# Patient Record
Sex: FEMALE | Race: WHITE | Employment: OTHER | URBAN - NONMETROPOLITAN AREA
[De-identification: names, ages, dates, MRNs, and addresses within clinical notes are randomized per-mention and may not be internally consistent; named-entity substitution may affect disease eponyms.]

---

## 2020-06-01 ENCOUNTER — PRE-PROCEDURE TELEPHONE (OUTPATIENT)
Dept: PREADMISSION TESTING | Age: 80
End: 2020-06-01

## 2020-06-01 ENCOUNTER — HOSPITAL ENCOUNTER (OUTPATIENT)
Dept: PREADMISSION TESTING | Age: 80
Discharge: HOME | End: 2020-06-05
Payer: MEDICARE

## 2020-06-01 VITALS
DIASTOLIC BLOOD PRESSURE: 80 MMHG | OXYGEN SATURATION: 97 % | RESPIRATION RATE: 20 BRPM | HEIGHT: 67 IN | WEIGHT: 94.4 LBS | HEART RATE: 68 BPM | SYSTOLIC BLOOD PRESSURE: 156 MMHG | BODY MASS INDEX: 14.82 KG/M2 | TEMPERATURE: 99.1 F

## 2020-06-01 LAB
ANION GAP SERPL CALCULATED.3IONS-SCNC: 12 MMOL/L (ref 9–17)
BUN BLDV-MCNC: 17 MG/DL (ref 8–23)
CHLORIDE BLD-SCNC: 99 MMOL/L (ref 98–107)
CO2: 26 MMOL/L (ref 20–31)
CREAT SERPL-MCNC: 0.61 MG/DL (ref 0.5–0.9)
GFR AFRICAN AMERICAN: >60 ML/MIN
GFR NON-AFRICAN AMERICAN: >60 ML/MIN
GFR SERPL CREATININE-BSD FRML MDRD: NORMAL ML/MIN/{1.73_M2}
GFR SERPL CREATININE-BSD FRML MDRD: NORMAL ML/MIN/{1.73_M2}
GLUCOSE BLD-MCNC: 122 MG/DL (ref 70–99)
HCT VFR BLD CALC: 45.4 % (ref 36.3–47.1)
HEMOGLOBIN: 14.6 G/DL (ref 11.9–15.1)
MCH RBC QN AUTO: 33.7 PG (ref 25.2–33.5)
MCHC RBC AUTO-ENTMCNC: 32.2 G/DL (ref 28.4–34.8)
MCV RBC AUTO: 104.8 FL (ref 82.6–102.9)
NRBC AUTOMATED: 0 PER 100 WBC
PDW BLD-RTO: 12.8 % (ref 11.8–14.4)
PLATELET # BLD: 398 K/UL (ref 138–453)
PMV BLD AUTO: 8.3 FL (ref 8.1–13.5)
POTASSIUM SERPL-SCNC: 4.8 MMOL/L (ref 3.7–5.3)
RBC # BLD: 4.33 M/UL (ref 3.95–5.11)
SODIUM BLD-SCNC: 137 MMOL/L (ref 135–144)
WBC # BLD: 13.2 K/UL (ref 3.5–11.3)

## 2020-06-01 PROCEDURE — 82947 ASSAY GLUCOSE BLOOD QUANT: CPT

## 2020-06-01 PROCEDURE — 93005 ELECTROCARDIOGRAM TRACING: CPT | Performed by: ANESTHESIOLOGY

## 2020-06-01 PROCEDURE — 84520 ASSAY OF UREA NITROGEN: CPT

## 2020-06-01 PROCEDURE — 82565 ASSAY OF CREATININE: CPT

## 2020-06-01 PROCEDURE — 36415 COLL VENOUS BLD VENIPUNCTURE: CPT

## 2020-06-01 PROCEDURE — 85027 COMPLETE CBC AUTOMATED: CPT

## 2020-06-01 PROCEDURE — 80051 ELECTROLYTE PANEL: CPT

## 2020-06-01 RX ORDER — TRAZODONE HYDROCHLORIDE 150 MG/1
150 TABLET ORAL NIGHTLY
COMMUNITY

## 2020-06-01 RX ORDER — ALBUTEROL SULFATE 90 UG/1
2 AEROSOL, METERED RESPIRATORY (INHALATION) EVERY 6 HOURS PRN
COMMUNITY

## 2020-06-01 RX ORDER — SENNOSIDES 8.6 MG
650 CAPSULE ORAL EVERY 8 HOURS PRN
Status: ON HOLD | COMMUNITY
End: 2020-07-09 | Stop reason: HOSPADM

## 2020-06-01 RX ORDER — ATENOLOL 50 MG/1
50 TABLET ORAL DAILY
COMMUNITY

## 2020-06-01 RX ORDER — ONDANSETRON 4 MG/1
4 TABLET, FILM COATED ORAL EVERY 8 HOURS PRN
COMMUNITY
Start: 2020-05-01 | End: 2021-05-01

## 2020-06-01 RX ORDER — SERTRALINE HYDROCHLORIDE 100 MG/1
100 TABLET, FILM COATED ORAL DAILY
COMMUNITY

## 2020-06-01 RX ORDER — NITROGLYCERIN 0.4 MG/1
1 TABLET SUBLINGUAL EVERY 5 MIN PRN
COMMUNITY

## 2020-06-01 RX ORDER — CYCLOBENZAPRINE HCL 5 MG
5 TABLET ORAL NIGHTLY
COMMUNITY

## 2020-06-01 RX ORDER — SODIUM CHLORIDE, SODIUM LACTATE, POTASSIUM CHLORIDE, CALCIUM CHLORIDE 600; 310; 30; 20 MG/100ML; MG/100ML; MG/100ML; MG/100ML
1000 INJECTION, SOLUTION INTRAVENOUS CONTINUOUS
Status: CANCELLED | OUTPATIENT
Start: 2020-06-01

## 2020-06-01 RX ORDER — HYDROCODONE BITARTRATE AND ACETAMINOPHEN 5; 325 MG/1; MG/1
1 TABLET ORAL 2 TIMES DAILY PRN
Status: ON HOLD | COMMUNITY
End: 2020-07-09 | Stop reason: HOSPADM

## 2020-06-01 RX ORDER — BUDESONIDE AND FORMOTEROL FUMARATE DIHYDRATE 160; 4.5 UG/1; UG/1
2 AEROSOL RESPIRATORY (INHALATION) 2 TIMES DAILY
COMMUNITY

## 2020-06-01 ASSESSMENT — PAIN DESCRIPTION - PROGRESSION: CLINICAL_PROGRESSION: GRADUALLY WORSENING

## 2020-06-01 ASSESSMENT — PAIN SCALES - GENERAL: PAINLEVEL_OUTOF10: 5

## 2020-06-01 ASSESSMENT — PAIN DESCRIPTION - ONSET: ONSET: ON-GOING

## 2020-06-01 ASSESSMENT — PAIN DESCRIPTION - LOCATION: LOCATION: BACK

## 2020-06-01 ASSESSMENT — PAIN DESCRIPTION - ORIENTATION: ORIENTATION: RIGHT;LEFT

## 2020-06-01 ASSESSMENT — PAIN - FUNCTIONAL ASSESSMENT: PAIN_FUNCTIONAL_ASSESSMENT: PREVENTS OR INTERFERES SOME ACTIVE ACTIVITIES AND ADLS

## 2020-06-01 ASSESSMENT — PAIN DESCRIPTION - FREQUENCY: FREQUENCY: CONTINUOUS

## 2020-06-01 ASSESSMENT — PAIN DESCRIPTION - PAIN TYPE: TYPE: CHRONIC PAIN

## 2020-06-01 NOTE — PROGRESS NOTES
Anesthesia Focused Assessment    STOP-BANG Sleep Apnea Questionnaire    SNORE loudly (heard through closed doors)? No  TIRED, fatigued, sleepy during daytime? No  OBSERVED stopping breathing during sleep? No  High blood PRESSURE being treated? Yes    BMI over 35? No  AGE over 48? Yes  NECK circumference over 16\"? No  GENDER (male)? No             Total 2  High risk 5-8  Intermediate risk 3-4  Low risk 0-2    Obstructive Sleep Apnea: denies  If YES, machine used: no     Type 1 DM:   no  T2DM:  no    Coronary Artery Disease:  Yes, stent x 1, Dr. Alison Truong (2019). Patient stopped her brilinta and aspirin \"two days\" after stent. Hypertension:  yes    Active smoker:  Quit 2010. She was a smoker for years prior to quitting. Drinks Alcohol:  no    Dentition: upper and lower full dentures    Defib / AICD / Pacemaker: no      Renal Failure/dialysis:  no    Patient was evaluated in PAT & anesthesia guidelines were applied. NPO guidelines, medication instructions and scheduled arrival time were reviewed with patient. Hx of anesthesia complications:  no  Family hx of anesthesia complications:  no                                                                                                                     Anesthesia contacted:   Yes, Dr. Sam Province or cardiac clearance ordered: yes, cardiac. Patient had a stent placed last year, stopped brilintia on her own \"two days\" after her stent was placed. She has not been back to her cardiologist, says that she \"didn't want to take it. \"  Patient also has an AAA, last checked with US 5/2017.     David Ceballos PA-C  6/1/20  11:24 AM

## 2020-06-01 NOTE — TELEPHONE ENCOUNTER
Patient returned phone call and she stated that she would like to go to [facility] to have her test done because [place] is too far of a drive for her. We will notify [facility] department of this.

## 2020-06-02 LAB
EKG ATRIAL RATE: 66 BPM
EKG P AXIS: 77 DEGREES
EKG P-R INTERVAL: 130 MS
EKG Q-T INTERVAL: 410 MS
EKG QRS DURATION: 76 MS
EKG QTC CALCULATION (BAZETT): 429 MS
EKG R AXIS: -49 DEGREES
EKG T AXIS: -23 DEGREES
EKG VENTRICULAR RATE: 66 BPM

## 2020-06-04 ENCOUNTER — HOSPITAL ENCOUNTER (OUTPATIENT)
Dept: PREADMISSION TESTING | Age: 80
Setting detail: SPECIMEN
Discharge: HOME | End: 2020-06-08
Payer: MEDICARE

## 2020-06-04 ENCOUNTER — PRE-PROCEDURE TELEPHONE (OUTPATIENT)
Dept: PREADMISSION TESTING | Age: 80
End: 2020-06-04

## 2020-06-26 ENCOUNTER — PRE-PROCEDURE TELEPHONE (OUTPATIENT)
Dept: PREADMISSION TESTING | Age: 80
End: 2020-06-26

## 2020-07-01 ENCOUNTER — HOSPITAL ENCOUNTER (OUTPATIENT)
Dept: PREADMISSION TESTING | Age: 80
Setting detail: SPECIMEN
Discharge: HOME | End: 2020-07-05
Payer: MEDICARE

## 2020-07-01 LAB
SARS-COV-2, PCR: NORMAL
SARS-COV-2, RAPID: NORMAL
SARS-COV-2: NOT DETECTED
SOURCE: NORMAL

## 2020-07-01 PROCEDURE — U0003 INFECTIOUS AGENT DETECTION BY NUCLEIC ACID (DNA OR RNA); SEVERE ACUTE RESPIRATORY SYNDROME CORONAVIRUS 2 (SARS-COV-2) (CORONAVIRUS DISEASE [COVID-19]), AMPLIFIED PROBE TECHNIQUE, MAKING USE OF HIGH THROUGHPUT TECHNOLOGIES AS DESCRIBED BY CMS-2020-01-R: HCPCS

## 2020-07-01 RX ORDER — ASPIRIN 81 MG/1
81 TABLET ORAL DAILY
COMMUNITY

## 2020-07-02 ENCOUNTER — TELEPHONE (OUTPATIENT)
Dept: PRIMARY CARE CLINIC | Age: 80
End: 2020-07-02

## 2020-07-06 ENCOUNTER — ANESTHESIA EVENT (OUTPATIENT)
Dept: OPERATING ROOM | Age: 80
DRG: 457 | End: 2020-07-06
Payer: MEDICARE

## 2020-07-06 ENCOUNTER — HOSPITAL ENCOUNTER (INPATIENT)
Age: 80
LOS: 3 days | Discharge: NURSING, REHAB OR LONG-TERM CARE | DRG: 457 | End: 2020-07-09
Attending: ORTHOPAEDIC SURGERY | Admitting: ORTHOPAEDIC SURGERY
Payer: MEDICARE

## 2020-07-06 ENCOUNTER — APPOINTMENT (OUTPATIENT)
Dept: GENERAL RADIOLOGY | Age: 80
DRG: 457 | End: 2020-07-06
Attending: ORTHOPAEDIC SURGERY
Payer: MEDICARE

## 2020-07-06 ENCOUNTER — ANESTHESIA (OUTPATIENT)
Dept: OPERATING ROOM | Age: 80
DRG: 457 | End: 2020-07-06
Payer: MEDICARE

## 2020-07-06 VITALS — SYSTOLIC BLOOD PRESSURE: 124 MMHG | OXYGEN SATURATION: 100 % | DIASTOLIC BLOOD PRESSURE: 64 MMHG | TEMPERATURE: 98.5 F

## 2020-07-06 PROBLEM — I10 HYPERTENSION: Status: ACTIVE | Noted: 2020-07-06

## 2020-07-06 PROBLEM — Z98.1 S/P LUMBAR FUSION: Status: ACTIVE | Noted: 2020-07-06

## 2020-07-06 PROBLEM — I25.10 CAD (CORONARY ARTERY DISEASE): Status: ACTIVE | Noted: 2020-07-06

## 2020-07-06 PROBLEM — M81.0 OSTEOPOROSIS: Status: ACTIVE | Noted: 2020-07-06

## 2020-07-06 PROBLEM — I73.9 PVD (PERIPHERAL VASCULAR DISEASE) (HCC): Status: ACTIVE | Noted: 2020-07-06

## 2020-07-06 PROBLEM — J44.9 COPD (CHRONIC OBSTRUCTIVE PULMONARY DISEASE) (HCC): Status: ACTIVE | Noted: 2020-07-06

## 2020-07-06 LAB
GFR NON-AFRICAN AMERICAN: >60 ML/MIN
GFR SERPL CREATININE-BSD FRML MDRD: >60 ML/MIN
GFR SERPL CREATININE-BSD FRML MDRD: NORMAL ML/MIN/{1.73_M2}
GLUCOSE BLD-MCNC: 98 MG/DL (ref 74–100)
HCT VFR BLD CALC: 30.4 % (ref 36.3–47.1)
HEMOGLOBIN: 9.8 G/DL (ref 11.9–15.1)
POC CREATININE: 0.56 MG/DL (ref 0.51–1.19)
SARS-COV-2, PCR: NORMAL
SARS-COV-2, RAPID: NOT DETECTED
SARS-COV-2: NORMAL
SOURCE: NORMAL

## 2020-07-06 PROCEDURE — 82947 ASSAY GLUCOSE BLOOD QUANT: CPT

## 2020-07-06 PROCEDURE — 2709999900 HC NON-CHARGEABLE SUPPLY: Performed by: ORTHOPAEDIC SURGERY

## 2020-07-06 PROCEDURE — 6360000002 HC RX W HCPCS: Performed by: ANESTHESIOLOGY

## 2020-07-06 PROCEDURE — 72100 X-RAY EXAM L-S SPINE 2/3 VWS: CPT

## 2020-07-06 PROCEDURE — 6370000000 HC RX 637 (ALT 250 FOR IP): Performed by: ORTHOPAEDIC SURGERY

## 2020-07-06 PROCEDURE — 7100000001 HC PACU RECOVERY - ADDTL 15 MIN: Performed by: ORTHOPAEDIC SURGERY

## 2020-07-06 PROCEDURE — C1713 ANCHOR/SCREW BN/BN,TIS/BN: HCPCS | Performed by: ORTHOPAEDIC SURGERY

## 2020-07-06 PROCEDURE — 2580000003 HC RX 258: Performed by: NURSE ANESTHETIST, CERTIFIED REGISTERED

## 2020-07-06 PROCEDURE — 1200000000 HC SEMI PRIVATE

## 2020-07-06 PROCEDURE — 2580000003 HC RX 258: Performed by: ORTHOPAEDIC SURGERY

## 2020-07-06 PROCEDURE — 85018 HEMOGLOBIN: CPT

## 2020-07-06 PROCEDURE — 0SG10AJ FUSION OF 2 OR MORE LUMBAR VERTEBRAL JOINTS WITH INTERBODY FUSION DEVICE, POSTERIOR APPROACH, ANTERIOR COLUMN, OPEN APPROACH: ICD-10-PCS | Performed by: ORTHOPAEDIC SURGERY

## 2020-07-06 PROCEDURE — 85014 HEMATOCRIT: CPT

## 2020-07-06 PROCEDURE — 0SB20ZZ EXCISION OF LUMBAR VERTEBRAL DISC, OPEN APPROACH: ICD-10-PCS | Performed by: ORTHOPAEDIC SURGERY

## 2020-07-06 PROCEDURE — 6360000002 HC RX W HCPCS

## 2020-07-06 PROCEDURE — U0002 COVID-19 LAB TEST NON-CDC: HCPCS

## 2020-07-06 PROCEDURE — 82565 ASSAY OF CREATININE: CPT

## 2020-07-06 PROCEDURE — 6360000002 HC RX W HCPCS: Performed by: ORTHOPAEDIC SURGERY

## 2020-07-06 PROCEDURE — 72020 X-RAY EXAM OF SPINE 1 VIEW: CPT

## 2020-07-06 PROCEDURE — 3700000000 HC ANESTHESIA ATTENDED CARE: Performed by: ORTHOPAEDIC SURGERY

## 2020-07-06 PROCEDURE — 6370000000 HC RX 637 (ALT 250 FOR IP): Performed by: STUDENT IN AN ORGANIZED HEALTH CARE EDUCATION/TRAINING PROGRAM

## 2020-07-06 PROCEDURE — 2500000003 HC RX 250 WO HCPCS: Performed by: NURSE ANESTHETIST, CERTIFIED REGISTERED

## 2020-07-06 PROCEDURE — 3700000001 HC ADD 15 MINUTES (ANESTHESIA): Performed by: ORTHOPAEDIC SURGERY

## 2020-07-06 PROCEDURE — 01NB0ZZ RELEASE LUMBAR NERVE, OPEN APPROACH: ICD-10-PCS | Performed by: ORTHOPAEDIC SURGERY

## 2020-07-06 PROCEDURE — 7100000000 HC PACU RECOVERY - FIRST 15 MIN: Performed by: ORTHOPAEDIC SURGERY

## 2020-07-06 PROCEDURE — 99221 1ST HOSP IP/OBS SF/LOW 40: CPT | Performed by: NURSE PRACTITIONER

## 2020-07-06 PROCEDURE — 6360000002 HC RX W HCPCS: Performed by: STUDENT IN AN ORGANIZED HEALTH CARE EDUCATION/TRAINING PROGRAM

## 2020-07-06 PROCEDURE — 3600000005 HC SURGERY LEVEL 5 BASE: Performed by: ORTHOPAEDIC SURGERY

## 2020-07-06 PROCEDURE — 2580000003 HC RX 258: Performed by: STUDENT IN AN ORGANIZED HEALTH CARE EDUCATION/TRAINING PROGRAM

## 2020-07-06 PROCEDURE — 36415 COLL VENOUS BLD VENIPUNCTURE: CPT

## 2020-07-06 PROCEDURE — 6360000002 HC RX W HCPCS: Performed by: NURSE ANESTHETIST, CERTIFIED REGISTERED

## 2020-07-06 PROCEDURE — 3600000015 HC SURGERY LEVEL 5 ADDTL 15MIN: Performed by: ORTHOPAEDIC SURGERY

## 2020-07-06 PROCEDURE — 2580000003 HC RX 258: Performed by: ANESTHESIOLOGY

## 2020-07-06 PROCEDURE — 2780000010 HC IMPLANT OTHER: Performed by: ORTHOPAEDIC SURGERY

## 2020-07-06 PROCEDURE — 2720000010 HC SURG SUPPLY STERILE: Performed by: ORTHOPAEDIC SURGERY

## 2020-07-06 PROCEDURE — 99223 1ST HOSP IP/OBS HIGH 75: CPT | Performed by: HOSPITALIST

## 2020-07-06 PROCEDURE — 2500000003 HC RX 250 WO HCPCS: Performed by: ORTHOPAEDIC SURGERY

## 2020-07-06 DEVICE — TRANSITION 2 LEVEL IMPLANT, LORDOSED, 28MM
Type: IMPLANTABLE DEVICE | Site: SPINE LUMBAR | Status: FUNCTIONAL
Brand: TRANSITION

## 2020-07-06 DEVICE — TRANSITION 6.5MM HA POLYAXIAL PEDICLE SCREW, 40MM
Type: IMPLANTABLE DEVICE | Site: SPINE LUMBAR | Status: FUNCTIONAL
Brand: TRANSITION

## 2020-07-06 DEVICE — SIGNATURE TLIF SPACER, SMALL, 8MM
Type: IMPLANTABLE DEVICE | Site: SPINE LUMBAR | Status: FUNCTIONAL
Brand: SIGNATURE

## 2020-07-06 DEVICE — 6.5MM REVERE PEDICLE SCREW 45MM
Type: IMPLANTABLE DEVICE | Site: SPINE LUMBAR | Status: FUNCTIONAL
Brand: REVERE

## 2020-07-06 DEVICE — TRANSITION 2 LEVEL IMPLANT, LORDOSED, 32MM
Type: IMPLANTABLE DEVICE | Site: SPINE LUMBAR | Status: FUNCTIONAL
Brand: TRANSITION

## 2020-07-06 DEVICE — 6.5MM REVERE PEDICLE SCREW 50MM
Type: IMPLANTABLE DEVICE | Site: SPINE LUMBAR | Status: FUNCTIONAL
Brand: REVERE

## 2020-07-06 DEVICE — SIGNATURE TLIF SPACER, SMALL, 9MM
Type: IMPLANTABLE DEVICE | Site: SPINE LUMBAR | Status: FUNCTIONAL
Brand: SIGNATURE

## 2020-07-06 DEVICE — REVERE LOCKING CAP
Type: IMPLANTABLE DEVICE | Site: SPINE LUMBAR | Status: FUNCTIONAL
Brand: REVERE

## 2020-07-06 RX ORDER — SODIUM CHLORIDE 0.9 % (FLUSH) 0.9 %
10 SYRINGE (ML) INJECTION PRN
Status: DISCONTINUED | OUTPATIENT
Start: 2020-07-06 | End: 2020-07-09 | Stop reason: HOSPADM

## 2020-07-06 RX ORDER — TIZANIDINE 2 MG/1
4 TABLET ORAL EVERY 6 HOURS PRN
Status: DISCONTINUED | OUTPATIENT
Start: 2020-07-06 | End: 2020-07-09 | Stop reason: HOSPADM

## 2020-07-06 RX ORDER — PHENYLEPHRINE HYDROCHLORIDE 10 MG/ML
INJECTION INTRAVENOUS PRN
Status: DISCONTINUED | OUTPATIENT
Start: 2020-07-06 | End: 2020-07-06 | Stop reason: SDUPTHER

## 2020-07-06 RX ORDER — FENTANYL CITRATE 50 UG/ML
INJECTION, SOLUTION INTRAMUSCULAR; INTRAVENOUS PRN
Status: DISCONTINUED | OUTPATIENT
Start: 2020-07-06 | End: 2020-07-06 | Stop reason: SDUPTHER

## 2020-07-06 RX ORDER — SODIUM CHLORIDE 0.9 % (FLUSH) 0.9 %
10 SYRINGE (ML) INJECTION EVERY 12 HOURS SCHEDULED
Status: DISCONTINUED | OUTPATIENT
Start: 2020-07-06 | End: 2020-07-09 | Stop reason: HOSPADM

## 2020-07-06 RX ORDER — ROCURONIUM BROMIDE 10 MG/ML
INJECTION, SOLUTION INTRAVENOUS PRN
Status: DISCONTINUED | OUTPATIENT
Start: 2020-07-06 | End: 2020-07-06 | Stop reason: SDUPTHER

## 2020-07-06 RX ORDER — SODIUM CHLORIDE 9 MG/ML
INJECTION INTRAVENOUS PRN
Status: DISCONTINUED | OUTPATIENT
Start: 2020-07-06 | End: 2020-07-06 | Stop reason: SDUPTHER

## 2020-07-06 RX ORDER — ACETAMINOPHEN 325 MG/1
650 TABLET ORAL EVERY 6 HOURS
Status: DISCONTINUED | OUTPATIENT
Start: 2020-07-06 | End: 2020-07-09 | Stop reason: HOSPADM

## 2020-07-06 RX ORDER — SODIUM CHLORIDE 9 MG/ML
INJECTION, SOLUTION INTRAVENOUS CONTINUOUS
Status: DISCONTINUED | OUTPATIENT
Start: 2020-07-06 | End: 2020-07-07

## 2020-07-06 RX ORDER — ONDANSETRON 2 MG/ML
INJECTION INTRAMUSCULAR; INTRAVENOUS PRN
Status: DISCONTINUED | OUTPATIENT
Start: 2020-07-06 | End: 2020-07-06 | Stop reason: SDUPTHER

## 2020-07-06 RX ORDER — MORPHINE SULFATE 2 MG/ML
1 INJECTION, SOLUTION INTRAMUSCULAR; INTRAVENOUS
Status: DISCONTINUED | OUTPATIENT
Start: 2020-07-06 | End: 2020-07-09 | Stop reason: HOSPADM

## 2020-07-06 RX ORDER — DEXAMETHASONE SODIUM PHOSPHATE 10 MG/ML
INJECTION INTRAMUSCULAR; INTRAVENOUS PRN
Status: DISCONTINUED | OUTPATIENT
Start: 2020-07-06 | End: 2020-07-06 | Stop reason: SDUPTHER

## 2020-07-06 RX ORDER — ATENOLOL 50 MG/1
50 TABLET ORAL DAILY
Status: DISCONTINUED | OUTPATIENT
Start: 2020-07-07 | End: 2020-07-09 | Stop reason: HOSPADM

## 2020-07-06 RX ORDER — SODIUM CHLORIDE, SODIUM LACTATE, POTASSIUM CHLORIDE, CALCIUM CHLORIDE 600; 310; 30; 20 MG/100ML; MG/100ML; MG/100ML; MG/100ML
INJECTION, SOLUTION INTRAVENOUS CONTINUOUS
Status: DISCONTINUED | OUTPATIENT
Start: 2020-07-06 | End: 2020-07-06

## 2020-07-06 RX ORDER — ALBUTEROL SULFATE 90 UG/1
2 AEROSOL, METERED RESPIRATORY (INHALATION) EVERY 6 HOURS PRN
Status: DISCONTINUED | OUTPATIENT
Start: 2020-07-06 | End: 2020-07-09 | Stop reason: HOSPADM

## 2020-07-06 RX ORDER — 0.9 % SODIUM CHLORIDE 0.9 %
500 INTRAVENOUS SOLUTION INTRAVENOUS ONCE
Status: COMPLETED | OUTPATIENT
Start: 2020-07-06 | End: 2020-07-06

## 2020-07-06 RX ORDER — OXYCODONE HYDROCHLORIDE 5 MG/1
10 TABLET ORAL EVERY 4 HOURS PRN
Status: DISCONTINUED | OUTPATIENT
Start: 2020-07-06 | End: 2020-07-09 | Stop reason: HOSPADM

## 2020-07-06 RX ORDER — NITROGLYCERIN 0.4 MG/1
0.4 TABLET SUBLINGUAL EVERY 5 MIN PRN
Status: DISCONTINUED | OUTPATIENT
Start: 2020-07-06 | End: 2020-07-09 | Stop reason: HOSPADM

## 2020-07-06 RX ORDER — BUDESONIDE AND FORMOTEROL FUMARATE DIHYDRATE 160; 4.5 UG/1; UG/1
2 AEROSOL RESPIRATORY (INHALATION) 2 TIMES DAILY
Status: DISCONTINUED | OUTPATIENT
Start: 2020-07-06 | End: 2020-07-09 | Stop reason: HOSPADM

## 2020-07-06 RX ORDER — ONDANSETRON 4 MG/1
4 TABLET, FILM COATED ORAL EVERY 8 HOURS PRN
Status: DISCONTINUED | OUTPATIENT
Start: 2020-07-06 | End: 2020-07-09 | Stop reason: HOSPADM

## 2020-07-06 RX ORDER — MAGNESIUM HYDROXIDE 1200 MG/15ML
LIQUID ORAL CONTINUOUS PRN
Status: COMPLETED | OUTPATIENT
Start: 2020-07-06 | End: 2020-07-06

## 2020-07-06 RX ORDER — OXYCODONE HYDROCHLORIDE 5 MG/1
5 TABLET ORAL EVERY 4 HOURS PRN
Status: DISCONTINUED | OUTPATIENT
Start: 2020-07-06 | End: 2020-07-09 | Stop reason: HOSPADM

## 2020-07-06 RX ORDER — MORPHINE SULFATE 2 MG/ML
2 INJECTION, SOLUTION INTRAMUSCULAR; INTRAVENOUS
Status: DISCONTINUED | OUTPATIENT
Start: 2020-07-06 | End: 2020-07-09 | Stop reason: HOSPADM

## 2020-07-06 RX ORDER — ONDANSETRON 2 MG/ML
4 INJECTION INTRAMUSCULAR; INTRAVENOUS EVERY 6 HOURS PRN
Status: COMPLETED | OUTPATIENT
Start: 2020-07-06 | End: 2020-07-06

## 2020-07-06 RX ORDER — SODIUM CHLORIDE 9 MG/ML
INJECTION, SOLUTION INTRAVENOUS CONTINUOUS PRN
Status: DISCONTINUED | OUTPATIENT
Start: 2020-07-06 | End: 2020-07-06 | Stop reason: SDUPTHER

## 2020-07-06 RX ORDER — LIDOCAINE HYDROCHLORIDE 10 MG/ML
INJECTION, SOLUTION EPIDURAL; INFILTRATION; INTRACAUDAL; PERINEURAL PRN
Status: DISCONTINUED | OUTPATIENT
Start: 2020-07-06 | End: 2020-07-06 | Stop reason: SDUPTHER

## 2020-07-06 RX ORDER — POLYETHYLENE GLYCOL 3350 17 G/17G
17 POWDER, FOR SOLUTION ORAL DAILY PRN
Status: DISCONTINUED | OUTPATIENT
Start: 2020-07-06 | End: 2020-07-09 | Stop reason: HOSPADM

## 2020-07-06 RX ORDER — PROPOFOL 10 MG/ML
INJECTION, EMULSION INTRAVENOUS PRN
Status: DISCONTINUED | OUTPATIENT
Start: 2020-07-06 | End: 2020-07-06 | Stop reason: SDUPTHER

## 2020-07-06 RX ORDER — HEPARIN SODIUM 10000 [USP'U]/ML
INJECTION, SOLUTION INTRAVENOUS; SUBCUTANEOUS PRN
Status: DISCONTINUED | OUTPATIENT
Start: 2020-07-06 | End: 2020-07-06 | Stop reason: HOSPADM

## 2020-07-06 RX ADMIN — FENTANYL CITRATE 25 MCG: 50 INJECTION INTRAMUSCULAR; INTRAVENOUS at 13:41

## 2020-07-06 RX ADMIN — SODIUM CHLORIDE 500 ML: 9 INJECTION, SOLUTION INTRAVENOUS at 16:28

## 2020-07-06 RX ADMIN — MORPHINE SULFATE 2 MG: 2 INJECTION, SOLUTION INTRAMUSCULAR; INTRAVENOUS at 21:01

## 2020-07-06 RX ADMIN — LIDOCAINE HYDROCHLORIDE 50 MG: 10 INJECTION, SOLUTION EPIDURAL; INFILTRATION; INTRACAUDAL; PERINEURAL at 08:33

## 2020-07-06 RX ADMIN — SODIUM CHLORIDE, POTASSIUM CHLORIDE, SODIUM LACTATE AND CALCIUM CHLORIDE: 600; 310; 30; 20 INJECTION, SOLUTION INTRAVENOUS at 08:14

## 2020-07-06 RX ADMIN — ROCURONIUM BROMIDE 10 MG: 10 INJECTION INTRAVENOUS at 11:32

## 2020-07-06 RX ADMIN — SUGAMMADEX 120 MG: 100 INJECTION, SOLUTION INTRAVENOUS at 13:15

## 2020-07-06 RX ADMIN — FENTANYL CITRATE 100 MCG: 50 INJECTION INTRAMUSCULAR; INTRAVENOUS at 08:33

## 2020-07-06 RX ADMIN — TIZANIDINE 4 MG: 2 TABLET ORAL at 15:00

## 2020-07-06 RX ADMIN — ROCURONIUM BROMIDE 30 MG: 10 INJECTION INTRAVENOUS at 09:29

## 2020-07-06 RX ADMIN — HYDROMORPHONE HYDROCHLORIDE 0.5 MG: 1 INJECTION, SOLUTION INTRAMUSCULAR; INTRAVENOUS; SUBCUTANEOUS at 14:01

## 2020-07-06 RX ADMIN — DEXTROSE MONOHYDRATE 2 G: 50 INJECTION, SOLUTION INTRAVENOUS at 21:10

## 2020-07-06 RX ADMIN — OXYCODONE HYDROCHLORIDE 10 MG: 5 TABLET ORAL at 22:24

## 2020-07-06 RX ADMIN — HYDROMORPHONE HYDROCHLORIDE 0.25 MG: 1 INJECTION, SOLUTION INTRAMUSCULAR; INTRAVENOUS; SUBCUTANEOUS at 14:21

## 2020-07-06 RX ADMIN — FENTANYL CITRATE 25 MCG: 50 INJECTION INTRAMUSCULAR; INTRAVENOUS at 13:28

## 2020-07-06 RX ADMIN — CEFAZOLIN 2 G: 10 INJECTION, POWDER, FOR SOLUTION INTRAVENOUS at 12:50

## 2020-07-06 RX ADMIN — DEXAMETHASONE SODIUM PHOSPHATE 10 MG: 10 INJECTION INTRAMUSCULAR; INTRAVENOUS at 09:01

## 2020-07-06 RX ADMIN — HYDROMORPHONE HYDROCHLORIDE 0.25 MG: 1 INJECTION, SOLUTION INTRAMUSCULAR; INTRAVENOUS; SUBCUTANEOUS at 14:16

## 2020-07-06 RX ADMIN — FENTANYL CITRATE 50 MCG: 50 INJECTION INTRAMUSCULAR; INTRAVENOUS at 13:00

## 2020-07-06 RX ADMIN — ROCURONIUM BROMIDE 40 MG: 10 INJECTION INTRAVENOUS at 08:33

## 2020-07-06 RX ADMIN — HYDROMORPHONE HYDROCHLORIDE: 1 INJECTION, SOLUTION INTRAMUSCULAR; INTRAVENOUS; SUBCUTANEOUS at 14:31

## 2020-07-06 RX ADMIN — PROPOFOL 140 MG: 10 INJECTION, EMULSION INTRAVENOUS at 08:33

## 2020-07-06 RX ADMIN — PHENYLEPHRINE HYDROCHLORIDE 100 MCG: 10 INJECTION INTRAVENOUS at 11:42

## 2020-07-06 RX ADMIN — MORPHINE SULFATE 2 MG: 2 INJECTION, SOLUTION INTRAMUSCULAR; INTRAVENOUS at 18:07

## 2020-07-06 RX ADMIN — ACETAMINOPHEN 650 MG: 325 TABLET ORAL at 22:24

## 2020-07-06 RX ADMIN — FENTANYL CITRATE 25 MCG: 50 INJECTION INTRAMUSCULAR; INTRAVENOUS at 13:14

## 2020-07-06 RX ADMIN — SERTRALINE 100 MG: 50 TABLET, FILM COATED ORAL at 21:00

## 2020-07-06 RX ADMIN — HYDROMORPHONE HYDROCHLORIDE 0.5 MG: 1 INJECTION, SOLUTION INTRAMUSCULAR; INTRAVENOUS; SUBCUTANEOUS at 15:02

## 2020-07-06 RX ADMIN — FENTANYL CITRATE 50 MCG: 50 INJECTION INTRAMUSCULAR; INTRAVENOUS at 11:32

## 2020-07-06 RX ADMIN — SODIUM CHLORIDE 9 ML: 9 INJECTION INTRAMUSCULAR; INTRAVENOUS; SUBCUTANEOUS at 11:40

## 2020-07-06 RX ADMIN — OXYCODONE HYDROCHLORIDE 10 MG: 5 TABLET ORAL at 17:16

## 2020-07-06 RX ADMIN — ROCURONIUM BROMIDE 20 MG: 10 INJECTION INTRAVENOUS at 10:29

## 2020-07-06 RX ADMIN — HYDROMORPHONE HYDROCHLORIDE 0.5 MG: 1 INJECTION, SOLUTION INTRAMUSCULAR; INTRAVENOUS; SUBCUTANEOUS at 13:53

## 2020-07-06 RX ADMIN — PHENYLEPHRINE HYDROCHLORIDE 100 MCG: 10 INJECTION INTRAVENOUS at 11:40

## 2020-07-06 RX ADMIN — ONDANSETRON 4 MG: 2 INJECTION, SOLUTION INTRAMUSCULAR; INTRAVENOUS at 13:22

## 2020-07-06 RX ADMIN — FENTANYL CITRATE 25 MCG: 50 INJECTION INTRAMUSCULAR; INTRAVENOUS at 13:06

## 2020-07-06 RX ADMIN — CEFAZOLIN 2 G: 10 INJECTION, POWDER, FOR SOLUTION INTRAVENOUS at 08:50

## 2020-07-06 RX ADMIN — SODIUM CHLORIDE: 9 INJECTION, SOLUTION INTRAVENOUS at 12:40

## 2020-07-06 RX ADMIN — ONDANSETRON 4 MG: 2 INJECTION INTRAMUSCULAR; INTRAVENOUS at 14:37

## 2020-07-06 RX ADMIN — HYDROMORPHONE HYDROCHLORIDE 0.25 MG: 1 INJECTION, SOLUTION INTRAMUSCULAR; INTRAVENOUS; SUBCUTANEOUS at 14:26

## 2020-07-06 RX ADMIN — TRAZODONE HYDROCHLORIDE 150 MG: 100 TABLET ORAL at 21:00

## 2020-07-06 ASSESSMENT — PAIN SCALES - GENERAL
PAINLEVEL_OUTOF10: 10
PAINLEVEL_OUTOF10: 10
PAINLEVEL_OUTOF10: 5
PAINLEVEL_OUTOF10: 8
PAINLEVEL_OUTOF10: 10
PAINLEVEL_OUTOF10: 7
PAINLEVEL_OUTOF10: 10
PAINLEVEL_OUTOF10: 6
PAINLEVEL_OUTOF10: 7
PAINLEVEL_OUTOF10: 9
PAINLEVEL_OUTOF10: 10
PAINLEVEL_OUTOF10: 9
PAINLEVEL_OUTOF10: 7
PAINLEVEL_OUTOF10: 8

## 2020-07-06 ASSESSMENT — PULMONARY FUNCTION TESTS
PIF_VALUE: 7
PIF_VALUE: 14
PIF_VALUE: 15
PIF_VALUE: 15
PIF_VALUE: 14
PIF_VALUE: 15
PIF_VALUE: 14
PIF_VALUE: 14
PIF_VALUE: 15
PIF_VALUE: 14
PIF_VALUE: 14
PIF_VALUE: 7
PIF_VALUE: 15
PIF_VALUE: 14
PIF_VALUE: 14
PIF_VALUE: 15
PIF_VALUE: 14
PIF_VALUE: 15
PIF_VALUE: 2
PIF_VALUE: 2
PIF_VALUE: 15
PIF_VALUE: 12
PIF_VALUE: 18
PIF_VALUE: 15
PIF_VALUE: 14
PIF_VALUE: 14
PIF_VALUE: 2
PIF_VALUE: 14
PIF_VALUE: 14
PIF_VALUE: 15
PIF_VALUE: 14
PIF_VALUE: 14
PIF_VALUE: 7
PIF_VALUE: 15
PIF_VALUE: 2
PIF_VALUE: 14
PIF_VALUE: 15
PIF_VALUE: 14
PIF_VALUE: 2
PIF_VALUE: 14
PIF_VALUE: 15
PIF_VALUE: 14
PIF_VALUE: 14
PIF_VALUE: 15
PIF_VALUE: 14
PIF_VALUE: 15
PIF_VALUE: 15
PIF_VALUE: 14
PIF_VALUE: 14
PIF_VALUE: 2
PIF_VALUE: 15
PIF_VALUE: 14
PIF_VALUE: 15
PIF_VALUE: 14
PIF_VALUE: 14
PIF_VALUE: 15
PIF_VALUE: 14
PIF_VALUE: 2
PIF_VALUE: 15
PIF_VALUE: 1
PIF_VALUE: 13
PIF_VALUE: 15
PIF_VALUE: 15
PIF_VALUE: 14
PIF_VALUE: 5
PIF_VALUE: 14
PIF_VALUE: 27
PIF_VALUE: 14
PIF_VALUE: 14
PIF_VALUE: 13
PIF_VALUE: 14
PIF_VALUE: 7
PIF_VALUE: 15
PIF_VALUE: 14
PIF_VALUE: 2
PIF_VALUE: 14
PIF_VALUE: 14
PIF_VALUE: 15
PIF_VALUE: 15
PIF_VALUE: 14
PIF_VALUE: 16
PIF_VALUE: 14
PIF_VALUE: 15
PIF_VALUE: 14
PIF_VALUE: 15
PIF_VALUE: 14
PIF_VALUE: 15
PIF_VALUE: 15
PIF_VALUE: 14
PIF_VALUE: 15
PIF_VALUE: 14
PIF_VALUE: 14
PIF_VALUE: 15
PIF_VALUE: 15
PIF_VALUE: 14
PIF_VALUE: 1
PIF_VALUE: 15
PIF_VALUE: 14
PIF_VALUE: 14
PIF_VALUE: 1
PIF_VALUE: 14
PIF_VALUE: 15
PIF_VALUE: 14
PIF_VALUE: 15
PIF_VALUE: 14
PIF_VALUE: 13
PIF_VALUE: 14
PIF_VALUE: 14
PIF_VALUE: 15
PIF_VALUE: 14
PIF_VALUE: 15
PIF_VALUE: 14
PIF_VALUE: 7
PIF_VALUE: 15
PIF_VALUE: 15
PIF_VALUE: 14
PIF_VALUE: 15
PIF_VALUE: 14
PIF_VALUE: 14
PIF_VALUE: 15
PIF_VALUE: 15
PIF_VALUE: 1
PIF_VALUE: 14
PIF_VALUE: 15
PIF_VALUE: 14
PIF_VALUE: 14
PIF_VALUE: 15
PIF_VALUE: 13
PIF_VALUE: 14
PIF_VALUE: 2
PIF_VALUE: 15
PIF_VALUE: 14
PIF_VALUE: 14
PIF_VALUE: 13
PIF_VALUE: 14
PIF_VALUE: 15
PIF_VALUE: 14
PIF_VALUE: 15
PIF_VALUE: 14
PIF_VALUE: 14
PIF_VALUE: 1
PIF_VALUE: 14
PIF_VALUE: 15
PIF_VALUE: 15
PIF_VALUE: 14
PIF_VALUE: 14
PIF_VALUE: 16
PIF_VALUE: 14
PIF_VALUE: 15
PIF_VALUE: 14
PIF_VALUE: 15
PIF_VALUE: 14
PIF_VALUE: 2
PIF_VALUE: 14
PIF_VALUE: 14
PIF_VALUE: 15
PIF_VALUE: 15
PIF_VALUE: 14
PIF_VALUE: 1
PIF_VALUE: 15
PIF_VALUE: 14
PIF_VALUE: 15
PIF_VALUE: 1
PIF_VALUE: 2
PIF_VALUE: 15
PIF_VALUE: 13
PIF_VALUE: 14
PIF_VALUE: 14
PIF_VALUE: 12
PIF_VALUE: 14
PIF_VALUE: 7
PIF_VALUE: 2
PIF_VALUE: 14
PIF_VALUE: 15
PIF_VALUE: 14
PIF_VALUE: 14
PIF_VALUE: 15
PIF_VALUE: 14
PIF_VALUE: 14
PIF_VALUE: 15
PIF_VALUE: 15
PIF_VALUE: 2
PIF_VALUE: 14
PIF_VALUE: 14
PIF_VALUE: 15
PIF_VALUE: 14
PIF_VALUE: 14
PIF_VALUE: 15
PIF_VALUE: 14
PIF_VALUE: 15
PIF_VALUE: 15
PIF_VALUE: 14
PIF_VALUE: 15
PIF_VALUE: 15
PIF_VALUE: 14
PIF_VALUE: 2
PIF_VALUE: 15
PIF_VALUE: 14
PIF_VALUE: 15
PIF_VALUE: 18
PIF_VALUE: 2
PIF_VALUE: 15
PIF_VALUE: 1
PIF_VALUE: 14
PIF_VALUE: 7
PIF_VALUE: 1
PIF_VALUE: 14
PIF_VALUE: 15
PIF_VALUE: 15
PIF_VALUE: 7
PIF_VALUE: 14
PIF_VALUE: 14
PIF_VALUE: 15
PIF_VALUE: 15
PIF_VALUE: 2
PIF_VALUE: 14
PIF_VALUE: 15
PIF_VALUE: 7
PIF_VALUE: 14
PIF_VALUE: 2
PIF_VALUE: 16
PIF_VALUE: 15
PIF_VALUE: 15
PIF_VALUE: 14
PIF_VALUE: 15
PIF_VALUE: 15
PIF_VALUE: 14
PIF_VALUE: 8
PIF_VALUE: 14
PIF_VALUE: 16
PIF_VALUE: 14
PIF_VALUE: 15
PIF_VALUE: 14
PIF_VALUE: 15
PIF_VALUE: 14
PIF_VALUE: 15
PIF_VALUE: 15
PIF_VALUE: 14
PIF_VALUE: 14
PIF_VALUE: 15
PIF_VALUE: 7

## 2020-07-06 ASSESSMENT — ENCOUNTER SYMPTOMS
RESPIRATORY NEGATIVE: 1
EYES NEGATIVE: 1
ALLERGIC/IMMUNOLOGIC NEGATIVE: 1
BACK PAIN: 1
GASTROINTESTINAL NEGATIVE: 1

## 2020-07-06 ASSESSMENT — PAIN - FUNCTIONAL ASSESSMENT: PAIN_FUNCTIONAL_ASSESSMENT: 0-10

## 2020-07-06 ASSESSMENT — PAIN DESCRIPTION - PAIN TYPE
TYPE: SURGICAL PAIN
TYPE: SURGICAL PAIN

## 2020-07-06 ASSESSMENT — PAIN DESCRIPTION - ORIENTATION: ORIENTATION: RIGHT

## 2020-07-06 ASSESSMENT — PAIN DESCRIPTION - LOCATION
LOCATION: BACK
LOCATION: BACK

## 2020-07-06 ASSESSMENT — PAIN DESCRIPTION - DESCRIPTORS: DESCRIPTORS: OTHER (COMMENT)

## 2020-07-06 NOTE — H&P
Surgical History and Physical Exam    Reason for surgery:  The patient is a 68 y.o. female that presents today for L2, L3, L4 laminectomy and posterior lumbar interbody fusion L3-L4, L4-L5 w/Transition jefry L2-L5. Past Medical History:    Past Medical History:   Diagnosis Date    Abdominal aortic aneurysm (AAA) ([facility] [address]     US 12/2015, 5/2017 (2.9 cm on US 2017)    Aortic valve sclerosis     Arthritis     CAD (coronary artery disease)     stent Sept. 17, 2019 Dr. Sherry Hall [place] (stopped taking her antiplatelets)    COPD (chronic obstructive pulmonary disease) ([facility] [address]     Dr. Carolina Loving Hypertension     Dr. Carolina Loving Lumbar disc disease     Lung nodule     Osteoporosis     PVD (peripheral vascular disease) ([facility] [address]     Raynaud's disease     Wears dentures     full upper and lower    Wears prescription eyeglasses     Weight loss     Wellness examination     Dr. Lise Zamora last seen approx January 2020     Past Surgical History:    Past Surgical History:   Procedure Laterality Date    APPENDECTOMY      1962    CARDIAC CATHETERIZATION  09/2019    stents 2019 Dr. [place] Lagunas      cataract approx 2010   [address]    COLONOSCOPY      2010    DILATATION, ESOPHAGUS      small intestine removal of apricot. 3/19/2010 approx    ENDOSCOPY, COLON, DIAGNOSTIC      2010    ERCP      HYSTERECTOMY      TONSILLECTOMY      at 13 yrs old     Medications Prior to Admission:   Prior to Admission medications    Medication Sig Start Date End Date Taking? Authorizing Provider   aspirin 81 MG EC tablet Take 81 mg by mouth daily Pt. Will stop 7-2-20 for OR/ mgmt.     Yes Historical Provider, MD   budesonide-formoterol (SYMBICORT) 160-4.5 MCG/ACT AERO Inhale 2 puffs into the lungs 2 times daily    Historical Provider, MD   Tiotropium Bromide Monohydrate (SPIRIVA HANDIHALER IN) Inhale 1 puff into the lungs daily    Historical Provider, MD   albuterol sulfate HFA (VENTOLIN HFA) 108 (90 Base) MCG/ACT inhaler Inhale 2 puffs into the lungs every 6 hours as needed for Wheezing    Historical Provider, MD   atenolol (TENORMIN) 50 MG tablet Take 50 mg by mouth daily    Historical Provider, MD   sertraline (ZOLOFT) 100 MG tablet Take 100 mg by mouth daily    Historical Provider, MD   cyclobenzaprine (FLEXERIL) 5 MG tablet Take 5 mg by mouth nightly    Historical Provider, MD   traZODone (DESYREL) 150 MG tablet Take 150 mg by mouth nightly    Historical Provider, MD   HYDROcodone-acetaminophen (NORCO) 5-325 MG per tablet Take 1 tablet by mouth 2 times daily as needed for Pain. Historical Provider, MD   acetaminophen (TYLENOL) 650 MG extended release tablet Take 650 mg by mouth every 8 hours as needed    Historical Provider, MD   nitroGLYCERIN (NITROSTAT) 0.4 MG SL tablet Place 1 tablet under the tongue every 5 minutes as needed For 3 doses total    Historical Provider, MD   ondansetron (ZOFRAN) 4 MG tablet Take 4 mg by mouth every 8 hours as needed 5/1/20 5/1/21  Historical Provider, MD     Allergies:    Ciprofloxacin; Omeprazole; Doxycycline hyclate; Alprazolam; Lorazepam; and Sulfa antibiotics  Social History:   Social History     Socioeconomic History    Marital status:       Spouse name: None    Number of children: None    Years of education: None    Highest education level: None   Occupational History    None   Social Needs    Financial resource strain: None    Food insecurity     Worry: None     Inability: None    Transportation needs     Medical: None     Non-medical: None   Tobacco Use    Smoking status: Former Smoker     Packs/day: 0.50     Last attempt to quit: 1/1/2010     Years since quitting: 10.5    Smokeless tobacco: Never Used   Substance and Sexual Activity    Alcohol use: Not Currently    Drug use: Never    Sexual activity: None   Lifestyle    Physical activity     Days per week: None     Minutes per session: None    Stress: None   Relationships    Social connections     Talks on phone: None     Gets together: None     Attends Zoroastrian service: None     Active member of club or organization: None     Attends meetings of clubs or organizations: None     Relationship status: None    Intimate partner violence     Fear of current or ex partner: None     Emotionally abused: None     Physically abused: None     Forced sexual activity: None   Other Topics Concern    None   Social History Narrative    None     Family History:  Family History   Adopted: Yes       REVIEW OF SYSTEMS:  Constitutional: Negative for fever and chills. HENT: Negative for congestion or drainage   Eyes: Negative for blurred vision and double vision. Respiratory: Negative for cough, shortness of breath and wheezing. Cardiovascular: Negative for chest pain and palpitations. Gastrointestinal: Negative for nausea. Negative for vomiting. Musculoskeletal: Positive for lowe back pain   Skin: Negative for itching and rash. Neurological: Negative for dizziness, sensory change and headaches. Psychiatric/Behavioral: Negative for depression and suicidal ideas. PHYSICAL EXAM:  Temperature 98.8 °F (37.1 °C), height 5' 7\" (1.702 m), weight 88 lb (39.9 kg). Gen: alert and oriented, NAD, cooperative  Head: normocephalic atraumatic   Cardiovascular: Regular rate, no dependent edema, distal pulses 2+  Respiratory: Chest symmetric, no accessory muscle use, normal respirations    Spine: Skin intact. No sings of infection. TTP to lumbar paraspinal musculature. BLE: Skin intact. No TTP. Compartments soft. Foot warm and perfused. TA/EHL/FHL/GS motor intact. Deep and Superficial Peroneal/Saphenous/Sural SILT. A/P: 68 y.o. female  was evaluated and after discussion surgical and non surgical options, the patient has decided to undergo L2, L3, L4 laminectomy and posterior lumbar interbody fusion L3-L4, L4-L5 w/Transition jefry L2-L5. Consent obtained and in chart.  All questions answered appropriately. Surgical risks including but not limited to: bleeding, blood clots, infection, damage to surrounding tissues/nerves/vessels, failure of fixation, failure of wounds to heal, loss of motion, stiffness, dislocation, postoperative pain, recurrence of symptoms, need for future surgery,  risks of anesthesia, loss of limb and loss of life were all discussed with the patient. Knowing these risks, the patient wishes to proceed with surgery. -Abx OCTOR  -Site marked, Consent in chart  -AC held  -NPO since midnight  -All question answered.     Laila Bray DO   Orthopedic Surgery Resident PGY-3  [facility]

## 2020-07-06 NOTE — ANESTHESIA PRE PROCEDURE
Department of Anesthesiology  Preprocedure Note       Name:  Naima Nazario   Age:  68 y.o.  :  1940                                          MRN:  [id number]         Date:  2020      Surgeon: Macie Nazario):  Vincenzo Godinez MD    Procedure: Procedure(s):  LAMINECTOMY L2,3,4, POSTERIOR LUMBAR INTERBODY FUSION L4-5 WITH TRANSITION ARLETTE L2-L5, C-ARM. MARIBEL TABLE, GLOBUS,  AUTO    Medications prior to admission:   Prior to Admission medications    Medication Sig Start Date End Date Taking? Authorizing Provider   aspirin 81 MG EC tablet Take 81 mg by mouth daily Pt. Will stop 20 for OR/ mgmt.    Yes Historical Provider, MD   budesonide-formoterol (SYMBICORT) 160-4.5 MCG/ACT AERO Inhale 2 puffs into the lungs 2 times daily    Historical Provider, MD   Tiotropium Bromide Monohydrate (SPIRIVA HANDIHALER IN) Inhale 1 puff into the lungs daily    Historical Provider, MD   albuterol sulfate HFA (VENTOLIN HFA) 108 (90 Base) MCG/ACT inhaler Inhale 2 puffs into the lungs every 6 hours as needed for Wheezing    Historical Provider, MD   atenolol (TENORMIN) 50 MG tablet Take 50 mg by mouth daily    Historical Provider, MD   sertraline (ZOLOFT) 100 MG tablet Take 100 mg by mouth daily    Historical Provider, MD   cyclobenzaprine (FLEXERIL) 5 MG tablet Take 5 mg by mouth nightly    Historical Provider, MD   traZODone (DESYREL) 150 MG tablet Take 150 mg by mouth nightly    Historical Provider, MD   HYDROcodone-acetaminophen (NORCO) 5-325 MG per tablet Take 1 tablet by mouth 2 times daily as needed for Pain.     Historical Provider, MD   acetaminophen (TYLENOL) 650 MG extended release tablet Take 650 mg by mouth every 8 hours as needed    Historical Provider, MD   nitroGLYCERIN (NITROSTAT) 0.4 MG SL tablet Place 1 tablet under the tongue every 5 minutes as needed For 3 doses total    Historical Provider, MD   ondansetron (ZOFRAN) 4 MG tablet Take 4 mg by mouth every 8 hours as needed 20 Historical Provider, MD       Current medications:    No current facility-administered medications for this encounter. Allergies: Allergies   Allergen Reactions    Ciprofloxacin Other (See Comments)     Achilles tendon rupture    Omeprazole Swelling     Face, eyes, throat    Doxycycline Hyclate Diarrhea     Dry heaves also    Alprazolam Other (See Comments)     AGGRESIVE    Lorazepam Other (See Comments)     AGGRESSIVE    Sulfa Antibiotics Other (See Comments)     unknown       Problem List:  There is no problem list on file for this patient. Past Medical History:        Diagnosis Date    Abdominal aortic aneurysm (AAA) [address] [address]     US 12/2015, 5/2017 (2.9 cm on US 2017)    Aortic valve sclerosis     Arthritis     CAD (coronary artery disease)     stent Sept. 17, 2019 Dr. Gary Lerner [place] (stopped taking her antiplatelets)    COPD (chronic obstructive pulmonary disease) ([facility] [address]     Dr. Mcdaniel Hypertension     Dr. Mcdaniel Lumbar disc disease     Lung nodule     Osteoporosis     PVD (peripheral vascular disease) ([facility] [address]     Raynaud's disease     Wears dentures     full upper and lower    Wears prescription eyeglasses     Weight loss     Wellness examination     Dr. Dorothy Noonan last seen approx January 2020       Past Surgical History:        Procedure Laterality Date    APPENDECTOMY      1962    CARDIAC CATHETERIZATION  09/2019 stents 2019 Dr. Eryn Pelaez      cataract approx 2010   [address]    COLONOSCOPY      2010    DILATATION, ESOPHAGUS      small intestine removal of apricot.  3/19/2010 approx    ENDOSCOPY, COLON, DIAGNOSTIC      2010    ERCP      HYSTERECTOMY      TONSILLECTOMY      at 13 yrs old       Social History:    Social History     Tobacco Use    Smoking status: Former Smoker     Packs/day: 0.50     Last attempt to quit: 1/1/2010     Years since quitting: 10.5    Smokeless tobacco: Never Used   Substance Use Topics    Alcohol use: Not Currently                                Counseling given: Not Answered      Vital Signs (Current): There were no vitals filed for this visit. BP Readings from Last 3 Encounters:   06/01/20 (!) 156/80       NPO Status:                                                                                 BMI:   Wt Readings from Last 3 Encounters:   06/01/20 94 lb 6.4 oz (42.8 kg)     There is no height or weight on file to calculate BMI.    CBC:   Lab Results   Component Value Date    WBC 13.2 06/01/2020    RBC 4.33 06/01/2020    HGB 14.6 06/01/2020    HCT 45.4 06/01/2020    .8 06/01/2020    RDW 12.8 06/01/2020     06/01/2020       CMP:   Lab Results   Component Value Date     06/01/2020    K 4.8 06/01/2020    CL 99 06/01/2020    CO2 26 06/01/2020    BUN 17 06/01/2020    CREATININE 0.61 06/01/2020    GFRAA >60 06/01/2020    LABGLOM >60 06/01/2020    GLUCOSE 122 06/01/2020       POC Tests: No results for input(s): POCGLU, POCNA, POCK, POCCL, POCBUN, POCHEMO, POCHCT in the last 72 hours.     Coags: No results found for: PROTIME, INR, APTT    HCG (If Applicable): No results found for: PREGTESTUR, PREGSERUM, HCG, HCGQUANT     ABGs: No results found for: PHART, PO2ART, FPP8ZQD, AUK4BRQ, BEART, [id number]     Type & Screen (If Applicable):  No results found for: LABABO, LABRH    Drug/Infectious Status (If Applicable):  No results found for: HIV, HEPCAB    COVID-19 Screening (If Applicable):   Lab Results   Component Value Date    COVID19 Not Detected 07/01/2020         Anesthesia Evaluation  Patient summary reviewed and Nursing notes reviewed no history of anesthetic complications:   Airway: Mallampati: II  TM distance: >3 FB   Neck ROM: full  Mouth opening: > = 3 FB Dental:    (+) upper dentures and lower dentures      Pulmonary:normal exam    (+) COPD:                             Cardiovascular:  Exercise tolerance: good (>4 METS),   (+) hypertension:, CAD: non-obstructive, CABG/stent: no interval change,     (-)  angina,  CHF, orthopnea, PND and  MOORE    ECG reviewed  Rhythm: regular  Rate: normal  Echocardiogram reviewed  Stress test reviewed       Beta Blocker:  Not on Beta Blocker         Neuro/Psych:   Negative Neuro/Psych ROS              GI/Hepatic/Renal: Neg GI/Hepatic/Renal ROS            Endo/Other: Negative Endo/Other ROS                    Abdominal:           Vascular:                                      Anesthesia Plan      general     ASA 3       Induction: intravenous. MIPS: Postoperative opioids intended and Prophylactic antiemetics administered. Anesthetic plan and risks discussed with patient.       Plan discussed with CRNA and surgical team.                  Ashly Leigh MD   7/6/2020

## 2020-07-06 NOTE — BRIEF OP NOTE
Brief Postoperative Note      Patient: Ezequiel Buitrago  YOB: 1940  MRN: [id number]    Date of Procedure: 7/6/2020    Pre-Op Diagnosis: SCOLIOSIS, INSTABILITY,  LUMBAR STENOSIS    Post-Op Diagnosis: Same       Procedure(s):  1. LAMINECTOMY L3, L4,   2. POSTERIOR LUMBAR INTERBODY FUSION L4-5 WITH TRANSITION ARLETTE L2-L5    Surgeon(s):  Ashlee Turner MD    Assistant:  Resident: Dian Osei DO    Anesthesia: General    Estimated Blood Loss (mL): 275cc    IV Fluids: 1400cc crystalloid    Urine output: 532WA     Complications: None    Specimens: None    Implants:  Implant Name Type Inv. Item Serial No.  Lot No. LRB No. Used Action   SCREW PEDCL TRANS HA 6.5X40MM ST Spine SCREW PEDCL TRANS HA 6.5X40MM ST  [employer] [id number] N/A 1 Implanted   SCREW PEDCL TRANS HA 6.5X40MM ST Spine SCREW PEDCL TRANS HA 6.5X40MM ST  [employer] [id number] N/A 1 Implanted   IMPL SPINE SCREW TRANSITION 28MM 2LVL Spine IMPL SPINE SCREW TRANSITION 28MM 2LVL  [employer] [id number] N/A 1 Implanted   IMPL SPINE ARLETTE TRANSITION ST 32MM 2LVL Spine IMPL SPINE ARLETTE TRANSITION ST 32MM 2LVL  [employer] [id number] N/A 1 Implanted   IMPL SPINE CAGE SIGNATURE 5X8MM Spine IMPL SPINE CAGE SIGNATURE 5X8MM  [employer]  N/A 1 Implanted   SCREW SPINE PEDCL REVERE 6.5X45MM Spine SCREW SPINE PEDCL REVERE 6.5X45MM  [employer]  N/A 3 Implanted   SCREW SPINE PEDCL REVERE 6.5X50MM Spine SCREW SPINE PEDCL REVERE 6.5X50MM  [employer]  N/A 3 Implanted   IMPL SPINE CAGE SIGNATURE 5X9MM Spine IMPL SPINE CAGE SIGNATURE 5X9MM  [employer]  N/A 1 Implanted   CAP LK SPINAL STBL Spine CAP LK SPINAL STBL  [employer]  N/A 8 Implanted         Drains:   Closed/Suction Drain Inferior;Midline Back Other (Comment) (Active)       [REMOVED] Urethral Catheter Non-latex;Straight-tip;Double-lumen (Removed)       Findings: Lumbar stenosis with scoliosis. See op note for details.      Electronically signed by Dian Osei DO on 7/6/2020 at 1:40 PM

## 2020-07-06 NOTE — CONSULTS
[address] / HISTORY AND PHYSICAL EXAMINATION            Date:   7/6/2020  Patient name: Ken De Los Santos  Date of admission:  7/6/2020  6:04 AM  MRN:   [id number]  Account:  [de-identified]  YOB: 1940  PCP:    Tayla Flores MD  Room:   6056/6971-06  Code Status:    Full Code    Physician Requesting Consult: Tata Fry MD    Reason for Consult:  Post-op medical management    Chief Complaint:     No chief complaint on file. History Obtained From:     patient, electronic medical record    History of Present Illness: The patient is a 68 y.o. female that presents today for L2, L3, L4 laminectomy and posterior lumbar interbody fusion L3-L4, L4-L5 w/Transition jefry L2-L5. Past Medical History:     Past Medical History:   Diagnosis Date    Abdominal aortic aneurysm (AAA) ([facility]     US 12/2015, 5/2017 (2.9 cm on US 2017)    Aortic valve sclerosis     Arthritis     CAD (coronary artery disease)     stent Sept. 17, 2019 Dr. Renita Ontiveros [place] (stopped taking her antiplatelets)    COPD (chronic obstructive pulmonary disease) ([facility] [address]     Dr. Kimball Service Hypertension     Dr. Kimball Service Lumbar disc disease     Lung nodule     Osteoporosis     PVD (peripheral vascular disease) ([facility] [address]     Raynaud's disease     Wears dentures     full upper and lower    Wears prescription eyeglasses     Weight loss     Wellness examination     Dr. Guille Almeida last seen approx January 2020        Past Surgical History:     Past Surgical History:   Procedure Laterality Date    APPENDECTOMY      1962    BACK SURGERY  07/06/2020    L3-L4 denise fusion    CARDIAC CATHETERIZATION  09/2019    stents 2019 Dr. Oralia Graves      cataract approx 2010   [address]    COLONOSCOPY      2010    DILATATION, ESOPHAGUS      small intestine removal of apricot.  3/19/2010 approx    ENDOSCOPY, COLON, DIAGNOSTIC      2010    ERCP      HYSTERECTOMY      TONSILLECTOMY      at 13 yrs old        Medications Prior to Admission:     Prior to Admission medications    Medication Sig Start Date End Date Taking? Authorizing Provider   aspirin 81 MG EC tablet Take 81 mg by mouth daily Pt. Will stop 7-2-20 for OR/ mgmt.    Yes Historical Provider, MD   budesonide-formoterol (SYMBICORT) 160-4.5 MCG/ACT AERO Inhale 2 puffs into the lungs 2 times daily   Yes Historical Provider, MD   Tiotropium Bromide Monohydrate (SPIRIVA HANDIHALER IN) Inhale 1 puff into the lungs daily   Yes Historical Provider, MD   albuterol sulfate HFA (VENTOLIN HFA) 108 (90 Base) MCG/ACT inhaler Inhale 2 puffs into the lungs every 6 hours as needed for Wheezing   Yes Historical Provider, MD   atenolol (TENORMIN) 50 MG tablet Take 50 mg by mouth daily   Yes Historical Provider, MD   sertraline (ZOLOFT) 100 MG tablet Take 100 mg by mouth daily   Yes Historical Provider, MD   cyclobenzaprine (FLEXERIL) 5 MG tablet Take 5 mg by mouth nightly   Yes Historical Provider, MD   traZODone (DESYREL) 150 MG tablet Take 150 mg by mouth nightly   Yes Historical Provider, MD   HYDROcodone-acetaminophen (NORCO) 5-325 MG per tablet Take 1 tablet by mouth 2 times daily as needed for Pain. Yes Historical Provider, MD   acetaminophen (TYLENOL) 650 MG extended release tablet Take 650 mg by mouth every 8 hours as needed   Yes Historical Provider, MD   nitroGLYCERIN (NITROSTAT) 0.4 MG SL tablet Place 1 tablet under the tongue every 5 minutes as needed For 3 doses total    Historical Provider, MD   ondansetron (ZOFRAN) 4 MG tablet Take 4 mg by mouth every 8 hours as needed 5/1/20 5/1/21  Historical Provider, MD        Allergies:     Ciprofloxacin; Omeprazole; Doxycycline hyclate; Alprazolam; Lorazepam; and Sulfa antibiotics    Social History:     Tobacco:    reports that she quit smoking about 10 years ago. She smoked 0.50 packs per day.  She has never used smokeless tobacco.  Alcohol:      reports previous alcohol use. Drug Use:  reports no history of drug use. Family History:     Family History   Adopted: Yes       Review of Systems:     Positive and Negative as described in HPI. Review of Systems   Constitutional: Positive for activity change. Negative for chills and fever. HENT: Negative. Eyes: Negative. Respiratory: Negative. Cardiovascular: Negative. Gastrointestinal: Negative. Endocrine: Negative. Genitourinary: Negative. Musculoskeletal: Positive for arthralgias and back pain. Skin: Negative. Allergic/Immunologic: Negative. Neurological: Negative. Hematological: Bruises/bleeds easily. Psychiatric/Behavioral: Negative. Physical Exam:     BP (!) 85/48   Pulse 85   Temp 97.7 °F (36.5 °C) (Oral)   Resp 9   Ht 5' 7\" (1.702 m)   Wt 88 lb (39.9 kg)   SpO2 94%   BMI 13.78 kg/m²   Temp (24hrs), Av.6 °F (37 °C), Min:96.8 °F (36 °C), Max:99.6 °F (37.6 °C)    Recent Labs     20  0811   POCGLU 98       Intake/Output Summary (Last 24 hours) at 2020 1702  Last data filed at 2020 1653  Gross per 24 hour   Intake 1645 ml   Output 655 ml   Net 990 ml       Physical Exam  Vitals signs and nursing note reviewed. Constitutional:       Comments: Irritable. HENT:      Head: Normocephalic and atraumatic. Mouth/Throat:      Mouth: Mucous membranes are moist.   Eyes:      Extraocular Movements: Extraocular movements intact. Pupils: Pupils are equal, round, and reactive to light. Neck:      Musculoskeletal: Normal range of motion and neck supple. Trachea: Trachea normal.   Cardiovascular:      Rate and Rhythm: Normal rate and regular rhythm. Pulses: Normal pulses. Heart sounds: Normal heart sounds, S1 normal and S2 normal.   Pulmonary:      Effort: Pulmonary effort is normal.      Breath sounds: Normal breath sounds. Abdominal:      General: Abdomen is flat.  Bowel sounds are normal. There is no distension. Palpations: There is no mass. Tenderness: There is no abdominal tenderness. Genitourinary:     Comments: Rodriguez d/c at 1340. No urge to void yet. Musculoskeletal:      Thoracic back: She exhibits pain. Lumbar back: She exhibits pain. Right lower leg: No edema. Left lower leg: No edema. Skin:     General: Skin is cool and dry. Capillary Refill: Capillary refill takes less than 2 seconds. Findings: Ecchymosis present. Comments: Bilateral forearm ecchymosis. Neurological:      Mental Status: She is alert and oriented to person, place, and time. Psychiatric:         Speech: Speech normal.         Behavior: Behavior normal.      Comments: Irritable. + pain         Investigations:      Laboratory Testing:  Recent Results (from the past 24 hour(s))   COVID-19    Collection Time: 07/06/20  6:56 AM   Result Value Ref Range    SARS-CoV-2          SARS-CoV-2, Rapid Not Detected Not Detected    Source . NASOPHARYNGEAL SWAB     SARS-CoV-2, PCR         Creatinine W/GFR Point of Care    Collection Time: 07/06/20  8:11 AM   Result Value Ref Range    POC Creatinine 0.56 0.51 - 1.19 mg/dL    GFR Comment >60 >60 mL/min    GFR Non-African American >60 >60 mL/min    GFR Comment         POCT Glucose    Collection Time: 07/06/20  8:11 AM   Result Value Ref Range    POC Glucose 98 74 - 100 mg/dL       Imaging/Diagonstics:  Xr Lumbar Spine 1 Vw    Result Date: 7/6/2020  Lateral localization image intraoperative with probe superficial to the L5-S1 disc space. Assessment :      Hospital Problems           Last Modified POA    * (Principal) S/P lumbar fusion 7/6/2020 Yes    CAD (coronary artery disease) 7/6/2020 Yes    COPD (chronic obstructive pulmonary disease) ([address] 7/6/2020 Yes    Osteoporosis 7/6/2020 Yes    PVD (peripheral vascular disease) ([address] 7/6/2020 Yes    Hypertension 7/6/2020 Yes          Plan:     1.  Home medications per primary  2. Fluid bolus 500 ml for post-op hypotension  3. Pain control  4. Monitor labs. Will trend and replete electrolytes as needed. Adan H/H.  5. DVT prophylaxis  6. GI prophylaxis  7. Restart home diet  8.  PT/OT    Consultations:   IP CONSULT TO HOSPITALIST      KIA Mckeon NP  7/6/2020  5:02 PM    Copy sent to Dr. Osman Nguyen MD

## 2020-07-06 NOTE — CARE COORDINATION
Case Management Initial Discharge Plan  Tico Maier,             Met with:patient or spouse/SO to discuss discharge plans. Information verified: address, contacts, phone number, , insurance Yes    Emergency Contact/Next of Kin name & number: Catina Singh s/o    PCP: Joy Alejandro MD  Date of last visit: 1 mth    Insurance Provider: medicaree    Discharge Planning    Living Arrangements:  Spouse/Significant Other   Support Systems:  Spouse/Significant Other    Home has 1 stories  5 stairs to climb to get into front door, stairs to climb to reach second floor  Location of bedroom/bathroom in home     Patient able to perform ADL's:Independent however prior to surgery had been in a lot of pain and not moving real well    Current Services (outpatient & in home) none  DME equipment: none  DME provider:     Receiving oral anticoagulation therapy? No    If indicated:   Physician managing anticoagulation treatment: n/a  Where does patient obtain lab work for ATC treatment? Potential Assistance Needed:  N/A    Patient agreeable to home care: Yes if needed  Freedom of choice provided:  yes if needed    Prior SNF/Rehab Placement and Facility: none  Agreeable to SNF/Rehab: No  [place] of choice provided: n/a     Evaluation: no    Expected Discharge date:  07/10/20    Patient expects to be discharged to:  home vs snf  Follow Up Appointment: Best Day/ Time: Tuesday AM    Transportation provider: Marquez Briseno  Transportation arrangements needed for discharge: No    Readmission Risk              Risk of Unplanned Readmission:        9             Does patient have a readmission risk score greater than 14?: No  If yes, follow-up appointment must be made within 7 days of discharge.      Goals of Care: resume adls      Discharge Plan: goal is home w/s/o agreeable to home care if needed otherwise outpt therapy monitor therapy evals and progress          Electronically signed by Mimi Seals RN on 20 at 5:41 PM EDT

## 2020-07-07 LAB
ABSOLUTE EOS #: <0.03 K/UL (ref 0–0.44)
ABSOLUTE IMMATURE GRANULOCYTE: 0.24 K/UL (ref 0–0.3)
ABSOLUTE LYMPH #: 1.34 K/UL (ref 1.1–3.7)
ABSOLUTE MONO #: 1.48 K/UL (ref 0.1–1.2)
ALBUMIN SERPL-MCNC: 2.5 G/DL (ref 3.5–5.2)
ALBUMIN/GLOBULIN RATIO: 1.1 (ref 1–2.5)
ALP BLD-CCNC: 111 U/L (ref 35–104)
ALT SERPL-CCNC: 9 U/L (ref 5–33)
ANION GAP SERPL CALCULATED.3IONS-SCNC: 11 MMOL/L (ref 9–17)
AST SERPL-CCNC: 25 U/L
BASOPHILS # BLD: 0 % (ref 0–2)
BASOPHILS ABSOLUTE: 0.04 K/UL (ref 0–0.2)
BILIRUB SERPL-MCNC: <0.1 MG/DL (ref 0.3–1.2)
BILIRUBIN DIRECT: <0.08 MG/DL
BILIRUBIN, INDIRECT: ABNORMAL MG/DL (ref 0–1)
BUN BLDV-MCNC: 20 MG/DL (ref 8–23)
BUN/CREAT BLD: ABNORMAL (ref 9–20)
CALCIUM SERPL-MCNC: 8.1 MG/DL (ref 8.6–10.4)
CHLORIDE BLD-SCNC: 104 MMOL/L (ref 98–107)
CO2: 24 MMOL/L (ref 20–31)
CREAT SERPL-MCNC: 0.52 MG/DL (ref 0.5–0.9)
DIFFERENTIAL TYPE: ABNORMAL
EOSINOPHILS RELATIVE PERCENT: 0 % (ref 1–4)
GFR AFRICAN AMERICAN: >60 ML/MIN
GFR NON-AFRICAN AMERICAN: >60 ML/MIN
GFR SERPL CREATININE-BSD FRML MDRD: ABNORMAL ML/MIN/{1.73_M2}
GFR SERPL CREATININE-BSD FRML MDRD: ABNORMAL ML/MIN/{1.73_M2}
GLOBULIN: ABNORMAL G/DL (ref 1.5–3.8)
GLUCOSE BLD-MCNC: 102 MG/DL (ref 70–99)
HCT VFR BLD CALC: 28.8 % (ref 36.3–47.1)
HEMOGLOBIN: 8.8 G/DL (ref 11.9–15.1)
IMMATURE GRANULOCYTES: 1 %
LYMPHOCYTES # BLD: 8 % (ref 24–43)
MCH RBC QN AUTO: 31.8 PG (ref 25.2–33.5)
MCHC RBC AUTO-ENTMCNC: 30.6 G/DL (ref 28.4–34.8)
MCV RBC AUTO: 104 FL (ref 82.6–102.9)
MONOCYTES # BLD: 8 % (ref 3–12)
NRBC AUTOMATED: 0 PER 100 WBC
PDW BLD-RTO: 12.7 % (ref 11.8–14.4)
PLATELET # BLD: ABNORMAL K/UL (ref 138–453)
PLATELET ESTIMATE: ABNORMAL
PLATELET, FLUORESCENCE: NORMAL K/UL (ref 138–453)
PLATELET, IMMATURE FRACTION: NORMAL % (ref 1.1–10.3)
PMV BLD AUTO: ABNORMAL FL (ref 8.1–13.5)
POTASSIUM SERPL-SCNC: 5.2 MMOL/L (ref 3.7–5.3)
RBC # BLD: 2.77 M/UL (ref 3.95–5.11)
RBC # BLD: ABNORMAL 10*6/UL
SEG NEUTROPHILS: 83 % (ref 36–65)
SEGMENTED NEUTROPHILS ABSOLUTE COUNT: 14.86 K/UL (ref 1.5–8.1)
SODIUM BLD-SCNC: 139 MMOL/L (ref 135–144)
TOTAL PROTEIN: 4.8 G/DL (ref 6.4–8.3)
WBC # BLD: 18 K/UL (ref 3.5–11.3)
WBC # BLD: ABNORMAL 10*3/UL

## 2020-07-07 PROCEDURE — 80048 BASIC METABOLIC PNL TOTAL CA: CPT

## 2020-07-07 PROCEDURE — 6370000000 HC RX 637 (ALT 250 FOR IP): Performed by: INTERNAL MEDICINE

## 2020-07-07 PROCEDURE — 97166 OT EVAL MOD COMPLEX 45 MIN: CPT

## 2020-07-07 PROCEDURE — 85025 COMPLETE CBC W/AUTO DIFF WBC: CPT

## 2020-07-07 PROCEDURE — 94640 AIRWAY INHALATION TREATMENT: CPT

## 2020-07-07 PROCEDURE — 2580000003 HC RX 258: Performed by: INTERNAL MEDICINE

## 2020-07-07 PROCEDURE — 51701 INSERT BLADDER CATHETER: CPT

## 2020-07-07 PROCEDURE — 1200000000 HC SEMI PRIVATE

## 2020-07-07 PROCEDURE — 51798 US URINE CAPACITY MEASURE: CPT

## 2020-07-07 PROCEDURE — 6370000000 HC RX 637 (ALT 250 FOR IP): Performed by: STUDENT IN AN ORGANIZED HEALTH CARE EDUCATION/TRAINING PROGRAM

## 2020-07-07 PROCEDURE — 85055 RETICULATED PLATELET ASSAY: CPT

## 2020-07-07 PROCEDURE — 97530 THERAPEUTIC ACTIVITIES: CPT

## 2020-07-07 PROCEDURE — 6360000002 HC RX W HCPCS: Performed by: STUDENT IN AN ORGANIZED HEALTH CARE EDUCATION/TRAINING PROGRAM

## 2020-07-07 PROCEDURE — 99213 OFFICE O/P EST LOW 20 MIN: CPT

## 2020-07-07 PROCEDURE — 36415 COLL VENOUS BLD VENIPUNCTURE: CPT

## 2020-07-07 PROCEDURE — 80076 HEPATIC FUNCTION PANEL: CPT

## 2020-07-07 PROCEDURE — 2580000003 HC RX 258: Performed by: NURSE PRACTITIONER

## 2020-07-07 PROCEDURE — 99232 SBSQ HOSP IP/OBS MODERATE 35: CPT | Performed by: INTERNAL MEDICINE

## 2020-07-07 PROCEDURE — 97535 SELF CARE MNGMENT TRAINING: CPT

## 2020-07-07 PROCEDURE — 97162 PT EVAL MOD COMPLEX 30 MIN: CPT

## 2020-07-07 PROCEDURE — 2580000003 HC RX 258: Performed by: STUDENT IN AN ORGANIZED HEALTH CARE EDUCATION/TRAINING PROGRAM

## 2020-07-07 PROCEDURE — 6360000002 HC RX W HCPCS: Performed by: INTERNAL MEDICINE

## 2020-07-07 PROCEDURE — 6360000002 HC RX W HCPCS: Performed by: NURSE PRACTITIONER

## 2020-07-07 RX ORDER — KETOROLAC TROMETHAMINE 30 MG/ML
30 INJECTION, SOLUTION INTRAMUSCULAR; INTRAVENOUS ONCE
Status: COMPLETED | OUTPATIENT
Start: 2020-07-07 | End: 2020-07-07

## 2020-07-07 RX ORDER — OXYCODONE HYDROCHLORIDE AND ACETAMINOPHEN 5; 325 MG/1; MG/1
1 TABLET ORAL EVERY 6 HOURS PRN
Qty: 28 TABLET | Refills: 0 | Status: SHIPPED | OUTPATIENT
Start: 2020-07-07 | End: 2020-07-14

## 2020-07-07 RX ORDER — PROMETHAZINE HYDROCHLORIDE 25 MG/ML
12.5 INJECTION, SOLUTION INTRAMUSCULAR; INTRAVENOUS EVERY 6 HOURS PRN
Status: DISCONTINUED | OUTPATIENT
Start: 2020-07-07 | End: 2020-07-09 | Stop reason: HOSPADM

## 2020-07-07 RX ORDER — SODIUM CHLORIDE 9 MG/ML
INJECTION, SOLUTION INTRAVENOUS CONTINUOUS
Status: ACTIVE | OUTPATIENT
Start: 2020-07-07 | End: 2020-07-07

## 2020-07-07 RX ORDER — ONDANSETRON 2 MG/ML
4 INJECTION INTRAMUSCULAR; INTRAVENOUS EVERY 6 HOURS PRN
Status: DISCONTINUED | OUTPATIENT
Start: 2020-07-07 | End: 2020-07-09 | Stop reason: HOSPADM

## 2020-07-07 RX ORDER — 0.9 % SODIUM CHLORIDE 0.9 %
1000 INTRAVENOUS SOLUTION INTRAVENOUS ONCE
Status: COMPLETED | OUTPATIENT
Start: 2020-07-07 | End: 2020-07-07

## 2020-07-07 RX ORDER — GABAPENTIN 100 MG/1
200 CAPSULE ORAL 3 TIMES DAILY
Status: DISCONTINUED | OUTPATIENT
Start: 2020-07-07 | End: 2020-07-09 | Stop reason: HOSPADM

## 2020-07-07 RX ORDER — SODIUM CHLORIDE 9 MG/ML
INJECTION, SOLUTION INTRAVENOUS CONTINUOUS
Status: DISCONTINUED | OUTPATIENT
Start: 2020-07-07 | End: 2020-07-09 | Stop reason: HOSPADM

## 2020-07-07 RX ORDER — TIZANIDINE 2 MG/1
2 TABLET ORAL NIGHTLY PRN
Qty: 5 TABLET | Refills: 0 | Status: SHIPPED | OUTPATIENT
Start: 2020-07-07 | End: 2020-07-12

## 2020-07-07 RX ORDER — 0.9 % SODIUM CHLORIDE 0.9 %
250 INTRAVENOUS SOLUTION INTRAVENOUS ONCE
Status: COMPLETED | OUTPATIENT
Start: 2020-07-07 | End: 2020-07-07

## 2020-07-07 RX ADMIN — GABAPENTIN 200 MG: 100 CAPSULE ORAL at 14:56

## 2020-07-07 RX ADMIN — DEXTROSE MONOHYDRATE 2 G: 50 INJECTION, SOLUTION INTRAVENOUS at 04:48

## 2020-07-07 RX ADMIN — SODIUM CHLORIDE: 9 INJECTION, SOLUTION INTRAVENOUS at 21:26

## 2020-07-07 RX ADMIN — OXYCODONE HYDROCHLORIDE 10 MG: 5 TABLET ORAL at 20:30

## 2020-07-07 RX ADMIN — ACETAMINOPHEN 650 MG: 325 TABLET ORAL at 22:50

## 2020-07-07 RX ADMIN — ONDANSETRON HYDROCHLORIDE 4 MG: 4 TABLET, FILM COATED ORAL at 10:37

## 2020-07-07 RX ADMIN — MORPHINE SULFATE 2 MG: 2 INJECTION, SOLUTION INTRAMUSCULAR; INTRAVENOUS at 08:37

## 2020-07-07 RX ADMIN — KETOROLAC TROMETHAMINE 30 MG: 30 INJECTION, SOLUTION INTRAMUSCULAR at 13:01

## 2020-07-07 RX ADMIN — PROMETHAZINE HYDROCHLORIDE 12.5 MG: 25 INJECTION INTRAMUSCULAR; INTRAVENOUS at 00:59

## 2020-07-07 RX ADMIN — SERTRALINE 100 MG: 50 TABLET, FILM COATED ORAL at 08:37

## 2020-07-07 RX ADMIN — SODIUM CHLORIDE: 9 INJECTION, SOLUTION INTRAVENOUS at 08:41

## 2020-07-07 RX ADMIN — ACETAMINOPHEN 650 MG: 325 TABLET ORAL at 04:47

## 2020-07-07 RX ADMIN — OXYCODONE HYDROCHLORIDE 10 MG: 5 TABLET ORAL at 14:53

## 2020-07-07 RX ADMIN — BUDESONIDE AND FORMOTEROL FUMARATE DIHYDRATE 2 PUFF: 160; 4.5 AEROSOL RESPIRATORY (INHALATION) at 21:09

## 2020-07-07 RX ADMIN — SODIUM CHLORIDE 1000 ML: 9 INJECTION, SOLUTION INTRAVENOUS at 00:59

## 2020-07-07 RX ADMIN — GABAPENTIN 200 MG: 100 CAPSULE ORAL at 20:30

## 2020-07-07 RX ADMIN — ACETAMINOPHEN 650 MG: 325 TABLET ORAL at 16:03

## 2020-07-07 RX ADMIN — MORPHINE SULFATE 1 MG: 2 INJECTION, SOLUTION INTRAMUSCULAR; INTRAVENOUS at 22:51

## 2020-07-07 RX ADMIN — MORPHINE SULFATE 2 MG: 2 INJECTION, SOLUTION INTRAMUSCULAR; INTRAVENOUS at 02:48

## 2020-07-07 RX ADMIN — TRAZODONE HYDROCHLORIDE 150 MG: 100 TABLET ORAL at 20:30

## 2020-07-07 RX ADMIN — OXYCODONE HYDROCHLORIDE 10 MG: 5 TABLET ORAL at 04:47

## 2020-07-07 RX ADMIN — MORPHINE SULFATE 2 MG: 2 INJECTION, SOLUTION INTRAMUSCULAR; INTRAVENOUS at 11:40

## 2020-07-07 RX ADMIN — OXYCODONE HYDROCHLORIDE 10 MG: 5 TABLET ORAL at 09:39

## 2020-07-07 RX ADMIN — MORPHINE SULFATE 2 MG: 2 INJECTION, SOLUTION INTRAMUSCULAR; INTRAVENOUS at 16:03

## 2020-07-07 RX ADMIN — SODIUM CHLORIDE: 9 INJECTION, SOLUTION INTRAVENOUS at 11:46

## 2020-07-07 RX ADMIN — MORPHINE SULFATE 2 MG: 2 INJECTION, SOLUTION INTRAMUSCULAR; INTRAVENOUS at 13:57

## 2020-07-07 RX ADMIN — MORPHINE SULFATE 2 MG: 2 INJECTION, SOLUTION INTRAMUSCULAR; INTRAVENOUS at 18:25

## 2020-07-07 RX ADMIN — ACETAMINOPHEN 650 MG: 325 TABLET ORAL at 10:34

## 2020-07-07 RX ADMIN — SODIUM CHLORIDE 250 ML: 9 INJECTION, SOLUTION INTRAVENOUS at 21:26

## 2020-07-07 ASSESSMENT — PAIN SCALES - GENERAL
PAINLEVEL_OUTOF10: 7
PAINLEVEL_OUTOF10: 7
PAINLEVEL_OUTOF10: 8
PAINLEVEL_OUTOF10: 8
PAINLEVEL_OUTOF10: 10
PAINLEVEL_OUTOF10: 7
PAINLEVEL_OUTOF10: 8
PAINLEVEL_OUTOF10: 9
PAINLEVEL_OUTOF10: 10
PAINLEVEL_OUTOF10: 10
PAINLEVEL_OUTOF10: 6
PAINLEVEL_OUTOF10: 7
PAINLEVEL_OUTOF10: 7
PAINLEVEL_OUTOF10: 9
PAINLEVEL_OUTOF10: 7
PAINLEVEL_OUTOF10: 6
PAINLEVEL_OUTOF10: 9

## 2020-07-07 ASSESSMENT — PAIN DESCRIPTION - ORIENTATION: ORIENTATION: RIGHT

## 2020-07-07 ASSESSMENT — PAIN DESCRIPTION - PAIN TYPE: TYPE: SURGICAL PAIN

## 2020-07-07 ASSESSMENT — PAIN DESCRIPTION - LOCATION
LOCATION: BACK
LOCATION: BACK

## 2020-07-07 NOTE — OP NOTE
[address] [address]                                OPERATIVE REPORT    PATIENT NAME: Dany Blankenship                    :        1940  MED REC NO:   [id number]                             ROOM:  ACCOUNT NO:   [de-identified]                           ADMIT DATE: 2020  PROVIDER:     Beka Mendez MD    DATE OF PROCEDURE:  2020    SURGEON:  Beka Mendez MD    ASSISTANT:  Thuy Bardales DO    PREOPERATIVE DIAGNOSES:  1. Severe spinal stenosis, L3-L4, L4-L5. 2.  Degenerative lumbar scoliosis. 3.  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 POSTOPERATIVE DIAGNOSES:  1. Severe spinal stenosis, L3-L4, L4-L5. 2.  Degenerative lumbar scoliosis. 3.  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 PROCEDURES:  1. Decompressive lumbar laminectomy, L3 and L4, with bilateral partial  medial facetectomies, L3-L4, L4-L5. 2.  Posterior lumbar interbody fusion, L3-L4, L4-L5.  3.  Signature cage stabilization, L3-L4, L4-L5. 4.  [place] instrumentation with transition jefry, L2-L5. INDICATIONS:  The patient is a 72-year-old lady with significant spinal  stenosis, claudication symptoms, weakness, and several falls due to  weakness of the quadriceps, who presents for surgical decompression and  stabilization. She showed significant vertical instability upon  weightbearing films and supine imaging showed correction along with  severe foraminal stenosis and weakness. The surgical procedure, risks,  benefits, and complications were discussed with her with good  comprehension. Informed consent was obtained. NARRATIVE PROCEDURE:  The patient was brought into the operating room,  placed under appropriate general anesthesia, transferred to the  operating table in the prone position on the Denzel frame. Bony  prominences, axillae, and eyes were all protected. Perioperative  antibiotics were given prior to incision time. VTE prophylaxis was done  intraoperatively through SCD cuffs. The back was prepped and draped in  the usual fashion. Time-out was performed. We identified the level through the iliac crest region, made an incision  centered across the lower lumbar spine. Skin was incised sharply and  bleeders coagulated. Dissection was carried down across these segments. We dissected out across the capsule, identified on intraoperative x-ray. We then took down the facets at L3-L4 and L4-L5 and exposed the  transverse processes of L2, L3, L4, and L5. At this point, we used an osteotomy to take out each of the inferior  facets at each segment. This allowed the spine to be somewhat looser. We then did a complete laminectomy of L3 and L4. We went proximally up  above to the L2-L3 interspace and then did a medial facetectomy from  both L2-L3, L3-L4, L4-L5 segments. We traced out the nerve roots easily  at each segment, and the left side was severely stenotic and stuck at  L4-L5 and the right side more at L2-L3, L3-L4. At this point, we then  approached our interspace, doing the first disk at L4-L5. We did an annulus resection and then gently mobilized the disk space  using disk spreaders until we got the segment to move. We then cleaned  out all the interspace of the disk material and got endplate cartilage  off. We rasped the endplate, sized a size-8 Signature cage. We then  tamped the interspace full of bone graft, using morselized bone from  decompression, and then packed the Signature cage with graft and tamped  it into place and rotated it into position. This created a lordosis  segment. Once this was reduced, we then placed our screws at L4 and L5  bilaterally as we had each pedicle exposed. The bone was very  osteoporotic.   We did have evidence of a pedicle fracture on the left at  L5 and we traversed it with a longer screw to provide more stability. We had good purchase. We then went up at the L3 segment. We did a full  laminectomy at that segment, again _____ decompression and exposed the  L3-L4 disk from the right side. This was a collapsed segment. The  nerve root was quite impinged and compressed. So, once we were able to  mobilize across that interspace, we then opened the disk, did the disk   to further distract it, and we then decided to place our screw  to help hold it open due to the severely osteoporotic bone. Once we did  that, the lateral wall junction at each segment was burred. We passed  the pedicle probe down into it and checked with a ball-tip probe. The  hole was intact. We placed 6.5-mm screws. Good purchase was obtained  at each. We then placed a jefry temporally across that to help hold it  open as we spread the disk, paralleling the segment. Once that was  reduced, we then cleaned all the interspace of disk material out and  then sized a #9 signature cage. We again used the autogenous bone from  the decompression, tamped it into the interspace, provided a fusion  mass. We then packed our Signature cage with the graft, using a 9 mm,  and tamped it into place and rotated it into position. We then packed  the bone behind each of the segments. We now let the jefry loosen and  this allowed us to further collapse into lordosis as appropriate. We  then burred the lateral articular facet wall and transverse process  junction at L2. We then passed a pedicle probe down each segment. We  placed an HA transition screw at that segment, securing it. At this point, final x-ray showed good position and alignment. At this point, we then placed a Gelfoam brie across the dura to  minimize scarring after checking each of the nerve roots and noted to be  freed. There was no significant epidural bleeding. Each of the foramens was well opened.     We then measured our transition jefry to fit across the proximal segment  and passed it down across each of the set screws distally. We then  secured it into place, locking it into position. We placed a drain, brought out through a lateral stab incision along  with some vancomycin powder. We then closed the segment using #1 Vicryl  suture in the muscle and the fascia. We closed the subcutaneous tissues  using 2-0 and 0 Vicryl and we closed the skin with staples. Bulky  dressing was applied. The patient was then transferred to the bed,  awoken from anesthesia, and brought to the recovery room in satisfactory  Condition. Blood loss was two hundred milliliters. Christine Peacock MD    D: 07/06/2020 14:13:38       T: 07/06/2020 14:19:20     [email or web address]  Job#: [id number]     Doc#: [id number]    CC:   Leonarda Chambers MD

## 2020-07-07 NOTE — PROGRESS NOTES
Occupational Therapy   Occupational Therapy Initial Assessment  Date: 2020   Patient Name: Naima Nazario  MRN: [id number]     : 1940    Date of Service: 2020    Discharge Recommendations:  Patient would benefit from continued therapy after discharge  OT Equipment Recommendations  Other: CTA     Assessment   Performance deficits / Impairments: Decreased safe awareness;Decreased functional mobility ; Decreased balance;Decreased ADL status; Decreased cognition;Decreased posture;Decreased high-level IADLs;Decreased endurance;Decreased strength  Assessment: Patient demonstrates decreased functional activity tolerance as demonstrated by increased time to complete bed mobility at SBA and inability to achieve full stand this date with flexed trunk standing EOB, unable to complete extension to acheive neutral posture with patient denying help. Pt able to complete scooting at SBA with use of B UE for support and increased time to complete. OT services are warranted to maximize safety and independence for safe return to PLOF. Prognosis: Good  Decision Making: Medium Complexity  Patient Education: OT role, OT POC, purpose of evaluation, importance of OOB activity, importance of pushing self with therapy, need for assistance for safety - poor/fair return   REQUIRES OT FOLLOW UP: Yes  Activity Tolerance  Activity Tolerance: Patient limited by pain  Safety Devices  Safety Devices in place: Yes  Type of devices: All fall risk precautions in place; Patient at risk for falls;Call light within reach; Left in bed;Nurse notified; Bed alarm in place;Gait belt  Restraints  Initially in place: No         Patient Diagnosis(es): The encounter diagnosis was Post-op pain.      has a past medical history of Abdominal aortic aneurysm (AAA) ([facility] [address] Aortic valve sclerosis, Arthritis, CAD (coronary artery disease), COPD (chronic obstructive pulmonary disease) ([facility] [address] Hypertension, Lumbar disc disease, Lung nodule, Osteoporosis, PVD (peripheral vascular disease) ([facility] [address] Raynaud's disease, Wears dentures, Wears prescription eyeglasses, Weight loss, and Wellness examination. has a past surgical history that includes Hysterectomy; Appendectomy; Cholecystectomy; Colonoscopy; Endoscopy, colon, diagnostic; Dilatation, esophagus; Tonsillectomy; Cardiac catheterization (09/2019); Cataract removal; ERCP; and back surgery (07/06/2020). Restrictions  Restrictions/Precautions  Restrictions/Precautions: Up as Tolerated, Weight Bearing  Lower Extremity Weight Bearing Restrictions  Right Lower Extremity Weight Bearing: Weight Bearing As Tolerated  Left Lower Extremity Weight Bearing: Weight Bearing As Tolerated  Position Activity Restriction  Other position/activity restrictions: up with assistance     Subjective   General  Patient assessed for rehabilitation services?: Yes  Family / Caregiver Present: No  General Comment  Comments: RN ok'd patient for OT/PT evaluation. Pt pleasant and cooperative throughout. Patient Currently in Pain: Yes  Pain Assessment  Pain Assessment: 0-10  Pain Level: 10  Pain Type: Surgical pain  Pain Location: Back  Pain Orientation: Right  Non-Pharmaceutical Pain Intervention(s): Ambulation/Increased Activity; Distraction; Therapeutic presence  Response to Pain Intervention: Patient Satisfied  Vital Signs  Patient Currently in Pain: Yes    Social/Functional History  Social/Functional History  Lives With: Significant other  Type of Home: House  Home Layout: One level, Performs ADL's on one level  Home Access: Stairs to enter with rails  Entrance Stairs - Number of Steps: 3  Entrance Stairs - Rails: Both  Bathroom Shower/Tub: Tub/Shower unit  Bathroom Toilet: Standard  Bathroom Equipment: Shower chair  Bathroom Accessibility: Accessible  Home Equipment: 4 wheeled walker, Rolling walker(uses 4ww at all times )  ADL Assistance: [facility] [address]: Needs assistance  Homemaking Responsibilities: Yes  Meal Prep Responsibility: Secondary  Laundry Responsibility: Secondary  Cleaning Responsibility: No(reports having a cleaning lady come in )  Shopping Responsibility: Secondary  Ambulation Assistance: Independent  Transfer Assistance: Independent  Active : Yes  Mode of Transportation: [facility]  Occupation: Retired  Leisure & Hobbies: Own store      Objective   Vision: Impaired  Vision Exceptions: Wears glasses for reading  Hearing: Within functional limits    Orientation  Overall Orientation Status: Within Functional Limits     Balance  Sitting Balance: Stand by assistance(EOB for ~8-9 minutes; therapeutic touch and listening implemented d/t pt becoming tearful reporting d/t pain )  Standing Balance: Moderate assistance  Standing Balance  Time: ~30 seconds   Activity: EOB  Comment: patient unable to acheive full stand, in flexed trunk position with head down; pt reporting d/t pain unable to attempt extension for improved posture  ADL  Feeding: Independent  Grooming: Independent; Increased time to complete  UE Bathing: Minimal assistance; Increased time to complete;Setup  LE Bathing: Maximum assistance; Increased time to complete;Setup  UE Dressing: Minimal assistance; Increased time to complete;Setup  LE Dressing: Setup; Increased time to complete;Maximum assistance(OT facilitated pt attempt to don socks; pt refused stating she could not d/t pain in the back )  Toileting: Moderate assistance; Increased time to complete;Setup  Tone RUE  RUE Tone: Normotonic  Tone LUE  LUE Tone: Normotonic  Coordination  Movements Are Fluid And Coordinated: Yes     Bed mobility  Supine to Sit: Stand by assistance  Sit to Supine: Stand by assistance  Scooting: Stand by assistance  Comment: increased time to complete;  Pt able to use B UE to lift bottom off bed to complete scooting on EOB and to get self up in bed   Transfers  Sit to stand: Minimal assistance  Stand to sit: Minimal assistance  Transfer Comments: unable to achieve full stand; pt raising voice not to assist with transfer      Cognition  Overall Cognitive Status: Exceptions  Attention Span: Difficulty attending to directions  Safety Judgement: Decreased awareness of need for assistance;Decreased awareness of need for safety  Problem Solving: Decreased awareness of errors  Insights: Decreased awareness of deficits        Sensation  Overall Sensation Status: WFL        LUE AROM : WFL  Left Hand AROM: WFL  RUE AROM : WFL  Right Hand AROM: WFL  LUE Strength  Gross LUE Strength: WFL  LUE Strength Comment: grossly   RUE Strength  Gross RUE Strength: WFL  RUE Strength Comment: grossly              Plan   Plan  Times per week: 3-4x/wk   Current Treatment Recommendations: Strengthening, Endurance Training, Patient/Caregiver Education & Training, Equipment Evaluation, Education, & procurement, Self-Care / ADL, Home Management Training, Safety Education & Training, Functional Mobility Training    AM-PAC Score        AM-PAC Inpatient Daily Activity Raw Score: 17 (07/07/20 1133)  AM-PAC Inpatient ADL T-Scale Score : 37.26 (07/07/20 1133)  ADL Inpatient CMS 0-100% Score: 50.11 (07/07/20 1133)  ADL Inpatient CMS G-Code Modifier : CK (07/07/20 1133)    Goals  Short term goals  Time Frame for Short term goals: Patient will, by discharge  Short term goal 1: demonstrate UB self-cares at Mod I   Short term goal 2: demonstrate LB self-cares at Mod I using AE PRN   Short term goal 3: demonstrate functional transfers/mobility using LRD at Mod A to engage in ADLs   Short term goal 4: demonstrate ~8 min of dynamic standing tolerance using LRD [facility] [address] [facility] to engage in ADLs  Short term goal 5: demonstrate ~30 min of functional activity tolerance using EC/WS techniques PRN        Therapy Time   Individual Concurrent Group Co-treatment   Time In 0851         Time Out 0917         Minutes 26         Timed Code Treatment Minutes: 10 Minutes       Lamberto López OTR/L

## 2020-07-07 NOTE — ANESTHESIA POSTPROCEDURE EVALUATION
Department of Anesthesiology  Postprocedure Note    Patient: Ken De Los Santos  MRN: [id number]  YOB: 1940  Date of evaluation: 7/6/2020  Time:  9:13 PM     Procedure Summary     Date:  07/06/20 Room / Location:  [address]    Anesthesia Start:  0827 Anesthesia Stop:  8159    Procedure:  LAMINECTOMY L3, L4, POSTERIOR LUMBAR INTERBODY FUSION L4-5 WITH TRANSITION ARLETTE L2-L5 (N/A Spine Lumbar) Diagnosis:  (SCOLIOSIS, INSTABILITY, STENOSIS)    Surgeon:  Tata Fry MD Responsible Provider:  Martine Rebollar MD    Anesthesia Type:  general ASA Status:  3          Anesthesia Type: general    Aakash Phase I: Aakash Score: 7    Aakash Phase II:      Last vitals: Reviewed and per EMR flowsheets.      POST-OP ANESTHESIA NOTE       BP (!) 91/49   Pulse 108   Temp 98.9 °F (37.2 °C) (Oral)   Resp 18   Ht 5' 7\" (1.702 m)   Wt 88 lb (39.9 kg)   SpO2 92%   BMI 13.78 kg/m²    Pain Assessment: 0-10  Pain Level: 10     Anesthesia Post Evaluation    Patient location during evaluation: PACU  Patient participation: complete - patient participated  Level of consciousness: awake  Pain score: 10  Airway patency: patent  Nausea & Vomiting: no nausea and no vomiting  Complications: no  Cardiovascular status: hemodynamically stable  Respiratory status: acceptable  Hydration status: stable

## 2020-07-07 NOTE — PROGRESS NOTES
[facility]    Progress Note    Name:   José Luis Valdes  MRN:     [id number]     [place]:      [de-identified]   [facility] Day:  1  Admit Date:  7/6/2020  6:04 AM    PCP:   Harriett Gray MD  Code Status:  Full Code  Late documentation   Subjective:     C/C: back pain, RFC: postop medical mgmt   Interval History Status: not changed. Patient c/o nausea, uncontrolled pain at the surgical site. Maintaining O2 sats on room air.   +flatus, no BM. +urinaryt retention needed straight cath. Afebrile, hypotensive: SBP 90s received fluid boluses , denies dizziness/chest pain/SOB. CBC, BMP reviewed. Brief History:   68 y. o. female admitted for L2, L3, L4 laminectomy and posterior lumbar interbody fusion L3-L4, L4-L5 w/Transition jefry L2-L5. PMH: CAD s/p stents 2019, COPD, HTN, Osteoporosis, PVD. Review of Systems:     Constitutional:  negative for chills, fevers, sweats  Respiratory:  negative for cough, dyspnea on exertion, shortness of breath, wheezing  Cardiovascular:  negative for chest pain, chest pressure/discomfort, lower extremity edema, palpitations  Gastrointestinal:  negative for abdominal pain, constipation, diarrhea, +nausea, no vomiting  Neurological:  negative for dizziness, headache    Medications: Allergies:     Allergies   Allergen Reactions    Ciprofloxacin Other (See Comments)     Achilles tendon rupture    Omeprazole Swelling     Face, eyes, throat    Doxycycline Hyclate Diarrhea     Dry heaves also    Alprazolam Other (See Comments)     AGGRESIVE    Lorazepam Other (See Comments)     AGGRESSIVE    Sulfa Antibiotics Other (See Comments)     unknown       Current Meds:   Scheduled Meds:    gabapentin  200 mg Oral TID    atenolol  50 mg Oral Daily    budesonide-formoterol  2 puff Inhalation BID    sertraline  100 mg Oral Daily    tiotropium  2 puff Inhalation Daily    traZODone  150 mg Oral Nightly    sodium chloride flush  10 mL Intravenous 2 times per day    acetaminophen  650 mg Oral Q6H     Continuous Infusions:   PRN Meds: promethazine, ondansetron, albuterol sulfate HFA, nitroGLYCERIN, ondansetron, sodium chloride flush, polyethylene glycol, oxyCODONE **OR** oxyCODONE, morphine **OR** morphine, tiZANidine    Data:     Past Medical History:   has a past medical history of Abdominal aortic aneurysm (AAA) ([facility] [address] Aortic valve sclerosis, Arthritis, CAD (coronary artery disease), COPD (chronic obstructive pulmonary disease) ([facility] [address] Hypertension, Lumbar disc disease, Lung nodule, Osteoporosis, [facility] Raynaud's disease, Wears dentures, Wears prescription eyeglasses, Weight loss, and Wellness examination. Social History:   reports that she quit smoking about 10 years ago. She smoked 0.50 packs per day. She has never used smokeless tobacco. She reports previous alcohol use. She reports that she does not use drugs. Family History:   Family History   Adopted: Yes       Vitals:  BP (!) 104/51   Pulse 98   Temp 99.7 °F (37.6 °C) (Oral)   Resp 16   Ht 5' 7\" (1.702 m)   Wt 88 lb (39.9 kg)   SpO2 91%   BMI 13.78 kg/m²   Temp (24hrs), Av °F (37.2 °C), Min:98.5 °F (36.9 °C), Max:99.7 °F (37.6 °C)    Recent Labs     20  0811   POCGLU 98       I/O (24Hr):     Intake/Output Summary (Last 24 hours) at 2020 192  Last data filed at 2020 1835  Gross per 24 hour   Intake --   Output 930 ml   Net -930 ml       Labs:  Hematology:  Recent Labs     208 20  0653   WBC  --  18.0*   RBC  --  2.77*   HGB 9.8* 8.8*   HCT 30.4* 28.8*   MCV  --  104.0*   MCH  --  31.8   MCHC  --  30.6   RDW  --  12.7   PLT  --  See Reflexed IPF Result   MPV  --  NOT REPORTED     Chemistry:  Recent Labs     20  0811 20  0745   NA  --  139   K  --  5.2   CL  --  104   CO2  --  24   GLUCOSE  --  102*   BUN  --  20   CREATININE 0.56 0.52   ANIONGAP  --  11   LABGLOM >60 >60   GFRAA  --  >60   CALCIUM  --  8.1*     Recent Labs     07/06/20  0811 07/07/20  0745   PROT  --  4.8*   LABALBU  --  2.5*   AST  --  25   ALT  --  9   ALKPHOS  --  111*   BILITOT  --  <0.10*   BILIDIR  --  <0.08   POCGLU 98  --      Radiology:  Xr Lumbar Spine 1 Vw    Result Date: 7/6/2020  Lateral localization image intraoperative with probe superficial to the L5-S1 disc space. Physical Examination:        General appearance:  alert, cooperative and no distress, appears uncomfortable. +Dry heaving   Mental Status:  oriented to person, place and time and flat affect  Lungs:  Good b/l air entry, no rhonchi/rales  Heart:  regular rate and rhythm, +sys murmur  Abdomen:  soft, nontender, nondistended, normal bowel sounds   Extremities:  no edema, redness, tenderness in the calves  Skin:  no gross lesions, rashes, induration  Surgical site in dressing. Assessment:        Hospital Problems           Last Modified POA    * (Principal) S/P lumbar fusion 7/6/2020 Yes    CAD (coronary artery disease) 7/6/2020 Yes    COPD (chronic obstructive pulmonary disease) ([facility] [address] 7/6/2020 Yes    Osteoporosis 7/6/2020 Yes    PVD (peripheral vascular disease) ([facility] [address] 7/6/2020 Yes    Hypertension 7/6/2020 Yes        Plan:      - continue IV hydration. Hold antihypertensives. - postop urinary retention: encourage ambulation as tolerated and if ok with surgeons. Bladder scan and straight cath PRN. - leukocytosis likely reactive. Is afebrile. Keep off abx. Continue to monitor CBC daily. - reviewed reconciled home medications.   - DVT prophylaxis per primary.      Pearl Bell MD  7/7/2020

## 2020-07-07 NOTE — PLAN OF CARE
Decrease in sensory misperception frequency  Description: Decrease in sensory misperception frequency  Outcome: Ongoing  Goal: Able to refrain from responding to false sensory perceptions  Description: Able to refrain from responding to false sensory perceptions  Outcome: Ongoing  Goal: Demonstrates accurate environmental perceptions  Description: Demonstrates accurate environmental perceptions  Outcome: Ongoing  Goal: Able to distinguish between reality-based and nonreality-based thinking  Description: Able to distinguish between reality-based and nonreality-based thinking  Outcome: Ongoing  Goal: Able to interrupt nonreality-based thinking  Description: Able to interrupt nonreality-based thinking  Outcome: Ongoing     Problem: Sleep Pattern Disturbance:  Goal: Appears well-rested  Description: Appears well-rested  Outcome: Ongoing     Problem: Falls - Risk of:  Goal: Absence of physical injury  Description: Absence of physical injury  Outcome: Met This Shift  Goal: Will remain free from falls  Description: Will remain free from falls  Outcome: Met This Shift

## 2020-07-07 NOTE — CARE COORDINATION
TRANSITIONAL CARE PLANNING/ 2 [facility] Day: 1    Reason for Admission: S/P lumbar fusion [Z98.1]     Treatment Plan of Care: PT/OT, pain control    Tests/Procedures still needed:     Barriers to Discharge: Pain control    Readmission Risk              Risk of Unplanned Readmission:        12            Patient goals/Treatment Preferences/Transitional Plan:   Spoke with patient and S.O. at bedside, pt states she is in a lot of pain post op, unable to stand up straight or take with therapy today. Her goal would be home with outpatient therapy but would like referral to [facility] rehab. Faxed and called referral, left VM for Eula in admissions at [facility] rehab.     Referrals Made:[facility]    Follow Up needed: referral to SNF

## 2020-07-07 NOTE — PROGRESS NOTES
Pt BP 88/53, . Pt asymptomatic. NP Aaron Ríos notified. Orders received and carried out. RN will continue to monitor.

## 2020-07-07 NOTE — PROGRESS NOTES
Orthopedic Progress Note    Patient: Tiffanie Vaz  YOB: 1940     68 y.o. female    Subjective:  Patient seen and examined at bedside. Nausea overnight, received Zofran/Phenergan. Continue to monitor. Hypotension overnight, received fluid bolus. Remarks lumbar pain around surgical location  Straight cathed x1 this morning for retention  Denies fever, HA, CP, dysuria  Admits to some SOB and nausea  -BM/+flatus    Vitals reviewed, afebrile    Objective:   Vitals:    07/07/20 0445   BP: (!) 96/54   Pulse: 98   Resp: 16   Temp: 98.9 °F (37.2 °C)   SpO2: 91%     Gen: NAD, cooperative    L-Spine: Dressings to low back are c/d/i without saturation. Drain maintaining adequate suction, 180cc removed from drain this morning. No erythema or cellulitic changes outside the dressing. Appropriate TTP to the lumbar spine. BLE: No visible deformity or edema to lower extremities. Skin is intact. Non-tender to palpation. Compartments are soft and compressible. EHL/FHL/TA/GS complex motor intact with 5/5 strength. Sural, saphenous, superificial/deep peroneal, and plantar nerve distribution SILT. Dorsalis pedis pulse 2+ with BCR. Recent Labs     07/06/20  0811 07/06/20  1838   HGB  --  9.8*   HCT  --  30.4*   CREATININE 0.56  --       Meds: See rec for complete list    Impression/plan: 68 y.o. female s/p Laminectomy L3-L4, PLIF L4-L5 with transition jefry placement L2-L5, POD#1    -Complete post op Abx  -WB status: Activity as tolerated  -Maintain drain, continue to record output. Total output since surgery recorded at 260cc.  -Maintain dressings to low back. Ok to reinforce as needed per nursing. -IM team on for assistance with medical management  -Pain control PO/IV Medication. Attempt to Wean IV medications.    -DVT ppx: Please hold chemical AC until POD#5  -Ice to low back for pain control and edema  -Encourage Incentive Spirometry use  -PT/OT to eval and treat today  -Follow up in office

## 2020-07-07 NOTE — FLOWSHEET NOTE
07/07/20 1626   Encounter Summary   Services provided to: Patient   Support System Significant other;Family members   Place of [address] No   Continue Visiting   (7/7/20)   Complexity of Encounter Low   Length of Encounter 15 minutes   Spiritual Assessment Completed Yes   Routine   Type Post-procedure   Assessment Calm; Approachable;Coping   Intervention Active listening;Explored feelings, thoughts, concerns;Nurtured hope;Prayer;Sustaining presence/ Ministry of presence; Discussed belief system/[employer] practices/taylor   Outcome Acceptance;Comfort;Expressed gratitude

## 2020-07-07 NOTE — PROGRESS NOTES
Physical Therapy    Facility/Department: [address] ORTHO/MED SURG  Initial Assessment    NAME: Mark Martínez  : 1940  MRN: [id number]    Date of Service: 2020    Discharge Recommendations:  Patient would benefit from continued therapy after discharge   Assessment   Body structures, Functions, Activity limitations: Decreased functional mobility ; Decreased endurance;Decreased strength  Assessment: The pt stood with mod assist x 2. She was unable to take any steps. She could benefit from a continuation of PT following her DC. Prognosis: Good  Decision Making: Medium Complexity  PT Education: Goals;PT Role;Plan of Care  REQUIRES PT FOLLOW UP: Yes  Activity Tolerance  Activity Tolerance: Patient limited by fatigue;Patient limited by pain   Patient Diagnosis(es): The encounter diagnosis was Post-op pain. has a past medical history of Abdominal aortic aneurysm (AAA) ([address] Aortic valve sclerosis, Arthritis, CAD (coronary artery disease), COPD (chronic obstructive pulmonary disease) ([address] Hypertension, Lumbar disc disease, Lung nodule, Osteoporosis, PVD (peripheral vascular disease) ([address] Raynaud's disease, Wears dentures, Wears prescription eyeglasses, Weight loss, and Wellness examination. has a past surgical history that includes Hysterectomy; Appendectomy; Cholecystectomy; Colonoscopy; Endoscopy, colon, diagnostic; Dilatation, esophagus; Tonsillectomy; Cardiac catheterization (2019); Cataract removal; ERCP; and back surgery (2020).   Restrictions  Restrictions/Precautions  Restrictions/Precautions: Up as Tolerated, Weight Bearing  Lower Extremity Weight Bearing Restrictions  Right Lower Extremity Weight Bearing: Weight Bearing As Tolerated  Left Lower Extremity Weight Bearing: Weight Bearing As Tolerated  Position Activity Restriction  Other position/activity restrictions: up with assistance   Vision/Hearing      Glasses for reading  Hearing   [facility]  Subjective  General  Patient assessed for rehabilitation services?: Yes  Response To Previous Treatment: Not applicable  Family / Caregiver Present: No  Follows Commands: Within Functional Limits  Pain Screening  Patient Currently in Pain: Yes  Pain Assessment  Pain Assessment: 0-10  Pain Level: 10  Pain Location: Back  Vital Signs  Patient Currently in Pain: Yes     Orientation  Orientation  Overall Orientation Status: Within Normal Limits  Social/Functional History  Social/Functional History  Lives With: Significant other  Type of Home: House  Home Layout: One level, Performs ADL's on one level  Home Access: Stairs to enter with rails  Entrance Stairs - Number of Steps: 3  Entrance Stairs - Rails: Both  Bathroom Shower/Tub: Tub/Shower unit  Bathroom Toilet: Standard  Bathroom Equipment: Shower chair  Bathroom Accessibility: Accessible  Home Equipment: 4 wheeled walker, Rolling walker(uses 4ww at all times )  ADL Assistance: [address] [place] [address] Avenue: Needs assistance  Homemaking Responsibilities: Yes  Meal Prep Responsibility: Secondary  Laundry Responsibility: Secondary  Cleaning Responsibility: No(reports having a cleaning lady come in )  Shopping Responsibility: Secondary  Ambulation Assistance: Independent  Transfer Assistance: Independent  Active : Yes  Mode of Transportation: [facility]  Occupation: Retired  Leisure & Hobbies: Own store   Cognition      Objective       AROM RLE (degrees)  RLE AROM: WFL  AROM LLE (degrees)  LLE AROM : WFL  AROM RUE (degrees)  RUE AROM : WFL  AROM LUE (degrees)  LUE AROM : WFL  Strength RLE  Strength RLE: WFL  Strength LLE  Strength LLE: WFL  Strength RUE  Strength RUE: WFL  Strength LUE  Strength LUE: WFL     Sensation  Overall Sensation Status: WFL  Bed mobility  Supine to Sit: Minimal assistance  Sit to Supine: Minimal assistance  Scooting: Minimal assistance  Transfers  Sit to Stand:  Moderate Assistance;2 Person Assistance  Stand to sit: Moderate Assistance;2 Person Assistance  Ambulation  Ambulation?: No  Ambulation 1  Surface: level tile  Device: Rolling Walker  Assistance: Moderate assistance;2 Person assistance  Distance: The pt stood with mod assist x 2 but was unable to take any steps.      Balance  Posture: Fair  Sitting - Static: Poor  Sitting - Dynamic: Poor      Plan   Plan  Times per week: 5-6x wk  Current Treatment Recommendations: Strengthening, Transfer Training, Endurance Training, Gait Training, Functional Mobility Training, Stair training, Pain Management, Safety Education & Training  Safety Devices  Type of devices: Left in bed, Nurse notified, Call light within reach, Bed alarm in place, Patient at risk for falls    G-Code     OutComes Score        AM-PAC Score  AM-PAC Inpatient Mobility Raw Score : 9 (07/07/20 1131)  AM-PAC Inpatient T-Scale Score : 30.55 (07/07/20 1131)  Mobility Inpatient CMS 0-100% Score: 81.38 (07/07/20 1131)  Mobility Inpatient CMS G-Code Modifier : CM (07/07/20 1131)       Goals  Short term goals  Time Frame for Short term goals: 10 visits  Short term goal 1: transfers with SBA  Short term goal 2: amb 125 ft with a RW x SBA  Short term goal 3: ascend/descend 4 steps with SBA  Short term goal 4: to be independent with bed mobility  Patient Goals   Patient goals : Return home     Therapy Time   Individual Concurrent Group Co-treatment   Time In 0900         Time Out 0923         Minutes 23             1 of [address], PT

## 2020-07-07 NOTE — PROGRESS NOTES
[address] [address] Continence Nurse  Consult Note       NAME:  Sherin Bush RECORD NUMBER:  [id number]  AGE: 68 y.o. GENDER: female  : 1940  TODAY'S DATE:  2020    Subjective:     Reason for WOCN Evaluation and Assessment: right inner arm skin tear s/p fall      Kota Doran is a 68 y.o. female referred by:   [x] Physician  [] Nursing  [] Other:     Wound Identification:  Wound Type: skin tear  Contributing Factors: shear force     Able to approximate the mid wound skin flap only. This flap appears to have 50% necrosis and covers 1/3 of the wound bed. No s/s infection. No surrounding ecchymosis. STAR classification 2b. PAST MEDICAL HISTORY        Diagnosis Date    Ab[facility] aortic aneurysm (AAA) ([facility]     US 2015, 2017 (2.9 cm on US 2017)    Aortic valve sclerosis     Arthritis     CAD (coronary artery disease)     stent 2019 Dr. Linda Mcgill [place] (stopped taking her antiplatelets)    COPD (chronic obstructive pulmonary disease) ([facility]     Dr. Wendy Ellis Hypertension     Dr. Wendy Ellis Lumbar disc disease     Lung nodule     Osteoporosis     PVD (peripheral vascular disease) ([facility]     Raynaud's disease     Wears dentures     full upper and lower    Wears prescription eyeglasses     Weight loss     Wellness examination     Dr. Marc Rivera last seen approx 2020       PAST SURGICAL HISTORY    Past Surgical History:   Procedure Laterality Date    APPENDECTOMY          BACK SURGERY  2020    L3-L4 denise fusion    CARDIAC CATHETERIZATION  2019 Dr. Delacruz Police      cataract approx    [address]    COLONOSCOPY      2010    DILATATION, ESOPHAGUS      small intestine removal of apricot.  3/19/2010 approx    ENDOSCOPY, COLON, DIAGNOSTIC          ERCP      HYSTERECTOMY      LUMBAR FUSION N/A 2020    LAMINECTOMY L3, L4, POSTERIOR LUMBAR INTERBODY FUSION L4-5 WITH TRANSITION ARLETTE L2-L5 performed by Prince Ramos MD at [facility]      at 13 yrs old       FAMILY HISTORY    Family History   Adopted: Yes       SOCIAL HISTORY    Social History     Tobacco Use    Smoking status: Former Smoker     Packs/day: 0.50     Last attempt to quit: 1/1/2010     Years since quitting: 10.5    Smokeless tobacco: Never Used   Substance Use Topics    Alcohol use: Not Currently    Drug use: Never       ALLERGIES    Allergies   Allergen Reactions    Ciprofloxacin Other (See Comments)     Achilles tendon rupture    Omeprazole Swelling     Face, eyes, throat    Doxycycline Hyclate Diarrhea     Dry heaves also    Alprazolam Other (See Comments)     AGGRESIVE    Lorazepam Other (See Comments)     AGGRESSIVE    Sulfa Antibiotics Other (See Comments)     unknown           Objective:      BP (!) 104/51   Pulse 98   Temp 99.7 °F (37.6 °C) (Oral)   Resp 16   Ht 5' 7\" (1.702 m)   Wt 88 lb (39.9 kg)   SpO2 91%   BMI 13.78 kg/m²   Audie Risk Score: Audie Scale Score: 19    LABS    CBC:   Lab Results   Component Value Date    WBC 18.0 07/07/2020    RBC 2.77 07/07/2020    HGB 8.8 07/07/2020     CMP:  Albumin:    Lab Results   Component Value Date    LABALBU 2.5 07/07/2020     PT/INR:  No results found for: PROTIME, INR  HgBA1c:  No results found for: LABA1C  PTT: No components found for: LABPTT      Assessment:     Patient Active Problem List   Diagnosis    S/P lumbar fusion    CAD (coronary artery disease)    [facility]    Osteoporosis    PVD (peripheral vascular disease) ([facility])    Hypertension       Measurements:     07/07/20 1245   Wound 07/07/20 Arm Right   Date First Assessed/Time First Assessed: 07/07/20 1224   Primary Wound Type: Skin Tear  Location: Arm  Wound Location Orientation: Right   Wound Skin Tear   Dressing Status Changed   Dressing Changed Changed/New   Dressing/Treatment Barrier film; Foam   Wound Cleansed Rinsed/Irrigated with saline   Dressing Change Due 07/12/20   Wound Length (cm) 4.2 cm   Wound Width (cm) 2 cm   Wound Depth (cm) 0.1 cm   Wound Surface Area (cm^2) 8.4 cm^2   Wound Volume (cm^3) 0.84 cm^3   Wound Assessment Pink   Drainage Amount Scant   Drainage Description Serosanguinous   Odor None   Yesika-wound Assessment Dry; Intact           Response to treatment:  Well tolerated by patient. Plan:     Plan of Care:   Leave the current foam dressing in place for 5 days. Remove the dressing in direction shown with arrows on the dressing to maintain the skin flap. Rinse the wound with saline and place a new silicone foam dressing.       Specialty Bed Required : Yes   [] Low Air Loss   [x] Pressure Redistribution  [] Fluid Immersion  [] Bariatric  [] Total Pressure Relief  [] Other:     Discharge Plan:  TBD    Patient/Caregiver Teaching:    [] Indicates understanding       [] Needs reinforcement  [] Unsuccessful      [x] Verbal Understanding  [] Demonstrated understanding       [] No evidence of learning  [] Refused teaching         [] N/A       Electronically signed by Sophie Bullard RN, CWON on 7/7/2020 at 1:12 PM

## 2020-07-07 NOTE — PLAN OF CARE
Problem: Pain:  Goal: Pain level will decrease  Description: Pain level will decrease  7/7/2020 1527 by Lori Feldman RN  Outcome: Ongoing  7/7/2020 0614 by Reuben Mcneil RN  Outcome: Ongoing     Problem: Pain:  Goal: Control of acute pain  Description: Control of acute pain  7/7/2020 1527 by Lori Feldman RN  Outcome: Ongoing  7/7/2020 0614 by Reuben Mcneil RN  Outcome: Ongoing     Problem: Injury - Risk of, Physical Injury:  Goal: Will remain free from falls  Description: Will remain free from falls  7/7/2020 1527 by Lori Feldman RN  Outcome: Ongoing  7/7/2020 0614 by Reuben Mcneil RN  Outcome: Met This Shift

## 2020-07-08 LAB
ABSOLUTE EOS #: <0.03 K/UL (ref 0–0.44)
ABSOLUTE IMMATURE GRANULOCYTE: 0.19 K/UL (ref 0–0.3)
ABSOLUTE LYMPH #: 1.15 K/UL (ref 1.1–3.7)
ABSOLUTE MONO #: 1.4 K/UL (ref 0.1–1.2)
ANION GAP SERPL CALCULATED.3IONS-SCNC: 12 MMOL/L (ref 9–17)
BASOPHILS # BLD: 0 % (ref 0–2)
BASOPHILS ABSOLUTE: 0.04 K/UL (ref 0–0.2)
BUN BLDV-MCNC: 17 MG/DL (ref 8–23)
BUN/CREAT BLD: ABNORMAL (ref 9–20)
CALCIUM SERPL-MCNC: 7.5 MG/DL (ref 8.6–10.4)
CHLORIDE BLD-SCNC: 108 MMOL/L (ref 98–107)
CO2: 18 MMOL/L (ref 20–31)
CREAT SERPL-MCNC: 0.44 MG/DL (ref 0.5–0.9)
DIFFERENTIAL TYPE: ABNORMAL
EOSINOPHILS RELATIVE PERCENT: 0 % (ref 1–4)
GFR AFRICAN AMERICAN: >60 ML/MIN
GFR NON-AFRICAN AMERICAN: >60 ML/MIN
GFR SERPL CREATININE-BSD FRML MDRD: ABNORMAL ML/MIN/{1.73_M2}
GFR SERPL CREATININE-BSD FRML MDRD: ABNORMAL ML/MIN/{1.73_M2}
GLUCOSE BLD-MCNC: 80 MG/DL (ref 70–99)
HCT VFR BLD CALC: 28.4 % (ref 36.3–47.1)
HEMOGLOBIN: 8.9 G/DL (ref 11.9–15.1)
IMMATURE GRANULOCYTES: 1 %
LYMPHOCYTES # BLD: 7 % (ref 24–43)
MCH RBC QN AUTO: 32.7 PG (ref 25.2–33.5)
MCHC RBC AUTO-ENTMCNC: 31.3 G/DL (ref 28.4–34.8)
MCV RBC AUTO: 104.4 FL (ref 82.6–102.9)
MONOCYTES # BLD: 8 % (ref 3–12)
NRBC AUTOMATED: 0 PER 100 WBC
PDW BLD-RTO: 12.8 % (ref 11.8–14.4)
PLATELET # BLD: ABNORMAL K/UL (ref 138–453)
PLATELET ESTIMATE: ABNORMAL
PLATELET, FLUORESCENCE: 534 K/UL (ref 138–453)
PLATELET, IMMATURE FRACTION: 0.6 % (ref 1.1–10.3)
PMV BLD AUTO: ABNORMAL FL (ref 8.1–13.5)
POTASSIUM SERPL-SCNC: 4.5 MMOL/L (ref 3.7–5.3)
RBC # BLD: 2.72 M/UL (ref 3.95–5.11)
RBC # BLD: ABNORMAL 10*6/UL
SEG NEUTROPHILS: 84 % (ref 36–65)
SEGMENTED NEUTROPHILS ABSOLUTE COUNT: 14.05 K/UL (ref 1.5–8.1)
SODIUM BLD-SCNC: 138 MMOL/L (ref 135–144)
WBC # BLD: 16.8 K/UL (ref 3.5–11.3)
WBC # BLD: ABNORMAL 10*3/UL

## 2020-07-08 PROCEDURE — 36415 COLL VENOUS BLD VENIPUNCTURE: CPT

## 2020-07-08 PROCEDURE — 99232 SBSQ HOSP IP/OBS MODERATE 35: CPT | Performed by: INTERNAL MEDICINE

## 2020-07-08 PROCEDURE — 94640 AIRWAY INHALATION TREATMENT: CPT

## 2020-07-08 PROCEDURE — 6370000000 HC RX 637 (ALT 250 FOR IP): Performed by: STUDENT IN AN ORGANIZED HEALTH CARE EDUCATION/TRAINING PROGRAM

## 2020-07-08 PROCEDURE — 6370000000 HC RX 637 (ALT 250 FOR IP): Performed by: ORTHOPAEDIC SURGERY

## 2020-07-08 PROCEDURE — 1200000000 HC SEMI PRIVATE

## 2020-07-08 PROCEDURE — 2580000003 HC RX 258: Performed by: NURSE PRACTITIONER

## 2020-07-08 PROCEDURE — 6360000002 HC RX W HCPCS: Performed by: STUDENT IN AN ORGANIZED HEALTH CARE EDUCATION/TRAINING PROGRAM

## 2020-07-08 PROCEDURE — 80048 BASIC METABOLIC PNL TOTAL CA: CPT

## 2020-07-08 PROCEDURE — 6360000002 HC RX W HCPCS: Performed by: NURSE PRACTITIONER

## 2020-07-08 PROCEDURE — 6360000002 HC RX W HCPCS: Performed by: ORTHOPAEDIC SURGERY

## 2020-07-08 PROCEDURE — 97530 THERAPEUTIC ACTIVITIES: CPT

## 2020-07-08 PROCEDURE — 6370000000 HC RX 637 (ALT 250 FOR IP): Performed by: INTERNAL MEDICINE

## 2020-07-08 RX ORDER — 0.9 % SODIUM CHLORIDE 0.9 %
1000 INTRAVENOUS SOLUTION INTRAVENOUS ONCE
Status: COMPLETED | OUTPATIENT
Start: 2020-07-08 | End: 2020-07-08

## 2020-07-08 RX ORDER — LIDOCAINE 4 G/G
1 PATCH TOPICAL DAILY
Status: DISCONTINUED | OUTPATIENT
Start: 2020-07-08 | End: 2020-07-09 | Stop reason: HOSPADM

## 2020-07-08 RX ORDER — SODIUM CHLORIDE 9 MG/ML
INJECTION, SOLUTION INTRAVENOUS CONTINUOUS
Status: ACTIVE | OUTPATIENT
Start: 2020-07-08 | End: 2020-07-08

## 2020-07-08 RX ORDER — TAMSULOSIN HYDROCHLORIDE 0.4 MG/1
0.4 CAPSULE ORAL DAILY
Status: DISCONTINUED | OUTPATIENT
Start: 2020-07-08 | End: 2020-07-09 | Stop reason: HOSPADM

## 2020-07-08 RX ORDER — 0.9 % SODIUM CHLORIDE 0.9 %
500 INTRAVENOUS SOLUTION INTRAVENOUS ONCE
Status: COMPLETED | OUTPATIENT
Start: 2020-07-08 | End: 2020-07-08

## 2020-07-08 RX ORDER — DEXAMETHASONE SODIUM PHOSPHATE 4 MG/ML
2 INJECTION, SOLUTION INTRA-ARTICULAR; INTRALESIONAL; INTRAMUSCULAR; INTRAVENOUS; SOFT TISSUE EVERY 8 HOURS
Status: COMPLETED | OUTPATIENT
Start: 2020-07-08 | End: 2020-07-09

## 2020-07-08 RX ADMIN — ACETAMINOPHEN 650 MG: 325 TABLET ORAL at 23:58

## 2020-07-08 RX ADMIN — OXYCODONE HYDROCHLORIDE 10 MG: 5 TABLET ORAL at 21:15

## 2020-07-08 RX ADMIN — BUDESONIDE AND FORMOTEROL FUMARATE DIHYDRATE 2 PUFF: 160; 4.5 AEROSOL RESPIRATORY (INHALATION) at 19:38

## 2020-07-08 RX ADMIN — ACETAMINOPHEN 650 MG: 325 TABLET ORAL at 04:24

## 2020-07-08 RX ADMIN — ACETAMINOPHEN 650 MG: 325 TABLET ORAL at 10:02

## 2020-07-08 RX ADMIN — GABAPENTIN 200 MG: 100 CAPSULE ORAL at 10:05

## 2020-07-08 RX ADMIN — ACETAMINOPHEN 650 MG: 325 TABLET ORAL at 17:00

## 2020-07-08 RX ADMIN — SERTRALINE 100 MG: 50 TABLET, FILM COATED ORAL at 10:02

## 2020-07-08 RX ADMIN — TIOTROPIUM BROMIDE INHALATION SPRAY 2 PUFF: 3.12 SPRAY, METERED RESPIRATORY (INHALATION) at 08:25

## 2020-07-08 RX ADMIN — TIZANIDINE 4 MG: 2 TABLET ORAL at 04:24

## 2020-07-08 RX ADMIN — MORPHINE SULFATE 2 MG: 2 INJECTION, SOLUTION INTRAMUSCULAR; INTRAVENOUS at 23:38

## 2020-07-08 RX ADMIN — BUDESONIDE AND FORMOTEROL FUMARATE DIHYDRATE 2 PUFF: 160; 4.5 AEROSOL RESPIRATORY (INHALATION) at 08:25

## 2020-07-08 RX ADMIN — GABAPENTIN 200 MG: 100 CAPSULE ORAL at 17:00

## 2020-07-08 RX ADMIN — DEXAMETHASONE SODIUM PHOSPHATE 2 MG: 4 INJECTION, SOLUTION INTRAMUSCULAR; INTRAVENOUS at 20:16

## 2020-07-08 RX ADMIN — SODIUM CHLORIDE 500 ML: 9 INJECTION, SOLUTION INTRAVENOUS at 02:49

## 2020-07-08 RX ADMIN — MORPHINE SULFATE 2 MG: 2 INJECTION, SOLUTION INTRAMUSCULAR; INTRAVENOUS at 20:09

## 2020-07-08 RX ADMIN — PROMETHAZINE HYDROCHLORIDE 12.5 MG: 25 INJECTION INTRAMUSCULAR; INTRAVENOUS at 14:12

## 2020-07-08 RX ADMIN — OXYCODONE HYDROCHLORIDE 10 MG: 5 TABLET ORAL at 13:02

## 2020-07-08 RX ADMIN — SODIUM CHLORIDE: 9 INJECTION, SOLUTION INTRAVENOUS at 05:21

## 2020-07-08 RX ADMIN — OXYCODONE HYDROCHLORIDE 10 MG: 5 TABLET ORAL at 17:00

## 2020-07-08 RX ADMIN — GABAPENTIN 200 MG: 100 CAPSULE ORAL at 20:14

## 2020-07-08 RX ADMIN — SODIUM CHLORIDE 1000 ML: 9 INJECTION, SOLUTION INTRAVENOUS at 00:58

## 2020-07-08 RX ADMIN — TAMSULOSIN HYDROCHLORIDE 0.4 MG: 0.4 CAPSULE ORAL at 17:00

## 2020-07-08 RX ADMIN — TRAZODONE HYDROCHLORIDE 150 MG: 100 TABLET ORAL at 20:16

## 2020-07-08 RX ADMIN — SODIUM CHLORIDE: 9 INJECTION, SOLUTION INTRAVENOUS at 12:59

## 2020-07-08 ASSESSMENT — PAIN SCALES - GENERAL
PAINLEVEL_OUTOF10: 8
PAINLEVEL_OUTOF10: 6
PAINLEVEL_OUTOF10: 6
PAINLEVEL_OUTOF10: 8
PAINLEVEL_OUTOF10: 10

## 2020-07-08 ASSESSMENT — PAIN DESCRIPTION - DESCRIPTORS: DESCRIPTORS: ACHING;DISCOMFORT

## 2020-07-08 ASSESSMENT — PAIN DESCRIPTION - LOCATION: LOCATION: BACK

## 2020-07-08 ASSESSMENT — PAIN - FUNCTIONAL ASSESSMENT: PAIN_FUNCTIONAL_ASSESSMENT: PREVENTS OR INTERFERES SOME ACTIVE ACTIVITIES AND ADLS

## 2020-07-08 ASSESSMENT — PAIN DESCRIPTION - PAIN TYPE: TYPE: SURGICAL PAIN

## 2020-07-08 NOTE — DISCHARGE INSTR - COC
Continuity of Care Form    Patient Name: Quoc Briones   :  1940  MRN:  [id number]    Admit date:  2020  Discharge date:  2020    Code Status Order: Full Code   Advance Directives:   Advance Care Flowsheet Documentation     Date/Time Healthcare Directive Type of Healthcare Directive Copy in [address] Agent's Name Healthcare Agent's Phone Number    20 1642  Yes, patient has an advance directive for healthcare treatment  Living will;Durable power of  for health care  --  --  --  --    20 1441  Yes, patient has an advance directive for healthcare treatment  Living will;Durable power of  for health care  --  --  --  --          Admitting Physician:  Alber Ba MD  PCP: Duarte Garduno MD    Discharging Nurse: [facility] Unit/Room#: 1910/8235-95  Discharging Unit Phone Number: [phone]    Emergency Contact:   Extended Emergency Contact Information  Primary Emergency Contact: Janeth Roy [address] [address] Phone: [phone]  Work Phone: [phone]  Mobile Phone: [phone]  Relation: Other   needed? No    Past Surgical History:  Past Surgical History:   Procedure Laterality Date    APPENDECTOMY      196    BACK SURGERY  2020    L3-L4 denise fusion    CARDIAC CATHETERIZATION  2019    stents 2019 Dr. Nilson Diallo      cataract approx    [address]    COLONOSCOPY      2010    DILATATION, ESOPHAGUS      small intestine removal of apricot. 3/19/2010 approx    ENDOSCOPY, COLON, DIAGNOSTIC          ERCP      HYSTERECTOMY      LUMBAR FUSION N/A 2020    LAMINECTOMY L3, L4, POSTERIOR LUMBAR INTERBODY FUSION L4-5 WITH TRANSITION ARLETTE L2-L5 performed by Alber Ba MD at [address] [place] [address]      at 13 yrs old       Immunization History: There is no immunization history on file for this patient.     Active Problems:  Patient Active Problem List Diagnosis Code    S/P lumbar fusion Z98.1    CAD (coronary artery disease) I25.10    COPD (chronic obstructive pulmonary disease) ([facility] J44.9    Osteoporosis M81.0    PVD (peripheral vascular disease) ([facility] I73.9    Hypertension I10       Isolation/Infection:   Isolation          No Isolation        Patient Infection Status     Infection Onset Added Last Indicated Last Indicated By Review Planned Expiration Resolved Resolved By    None active    Resolved    COVID-19 Rule Out 06/03/20 06/03/20 06/03/20 COVID-19 (Ordered)   07/01/20 Rule-Out Test Resulted          Nurse Assessment:  Last Vital Signs: /61   Pulse 90   Temp 97 °F (36.1 °C) (Axillary)   Resp 20   Ht 5' 7\" (1.702 m)   Wt 88 lb (39.9 kg)   SpO2 93%   BMI 13.78 kg/m²     Last documented pain score (0-10 scale): Pain Level: 8  Last Weight:   Wt Readings from Last 1 Encounters:   07/06/20 88 lb (39.9 kg)     Mental Status:  oriented, alert and thought processes intact    IV Access:- None    Nursing Mobility/ADLs:  Walking   Assisted  Transfer  Assisted  Bathing  Assisted  Dressing  Assisted  Toileting  Assisted  Feeding  Independent  Med Admin  Independent  Med Delivery   whole    Wound Care Documentation and Therapy:  Wound 07/07/20 Arm Right (Active)   Wound Skin Tear 7/8/2020  7:35 AM   Dressing Status Clean;Dry; Intact 7/8/2020  7:35 AM   Dressing Changed Changed/New 7/7/2020 12:45 PM   Dressing/Treatment Foam 7/8/2020  7:35 AM   Wound Cleansed Rinsed/Irrigated with saline 7/7/2020 12:45 PM   Dressing Change Due 07/12/20 7/7/2020 12:45 PM   Wound Length (cm) 4.2 cm 7/7/2020 12:45 PM   Wound Width (cm) 2 cm 7/7/2020 12:45 PM   Wound Depth (cm) 0.1 cm 7/7/2020 12:45 PM   Wound Surface Area (cm^2) 8.4 cm^2 7/7/2020 12:45 PM   Wound Volume (cm^3) 0.84 cm^3 7/7/2020 12:45 PM   Wound Assessment Other (Comment) 7/7/2020  8:00 PM   Drainage Amount None 7/7/2020  8:00 PM   Drainage Description Serosanguinous 7/7/2020 12:45 PM   Odor None 7/7/2020 12:45 PM   Yesika-wound Assessment Other (Comment) 7/7/2020  8:00 PM   Number of days: 1        Elimination:  Continence:   · Bowel: Yes  · Bladder: Yes  Urinary Catheter: Insertion Date: 7/9/2020 @ 2am   Colostomy/Ileostomy/Ileal Conduit: No       Date of Last BM: 7/7/2020    Intake/Output Summary (Last 24 hours) at 7/8/2020 1405  Last data filed at 7/7/2020 2030  Gross per 24 hour   Intake --   Output 450 ml   Net -450 ml     I/O last 3 completed shifts:  In: -   Out: 450 [Urine:350; Drains:100]    Safety Concerns:      At Risk for Falls    Impairments/Disabilities:      [address] [place] [address] Impairments/Disabilities:[id number]}    Nutrition Therapy:  Current Nutrition Therapy:   - Oral Diet:  General    Routes of Feeding: Oral  Liquids: No Restrictions  Daily Fluid Restriction: no  Last Modified Barium Swallow with Video (Video Swallowing Test): not done, not ordered    Treatments at the Time of Hospital Discharge:   Respiratory Treatments: every 4 hour aersol  Oxygen Therapy:  {Therapy; copd oxygen:67973}  Ventilator:  - No ventilator support    Rehab Therapies: Physical Therapy and Occupational Therapy  Weight Bearing Status/Restrictions: No weight bearing restirctions  Other Medical Equipment (for information only, NOT a DME order):  walker  Other Treatments: Remove kramer on 7/11/2020 and start void trial.    Patient's personal belongings (please select all that are sent with patient):  Glasses, Dentures upper and lower    RN SIGNATURE:  Electronically signed by Erin Akhtar RN on 7/9/20 at 1:47 PM EDT    CASE MANAGEMENT/SOCIAL WORK SECTION    Inpatient Status Date: 07/06/20    Readmission Risk Assessment Score:  Readmission Risk              Risk of Unplanned Readmission:        12           Discharging to Facility/ Agency   · Name:   · Address:  · Phone:  · Fax:    Dialysis Facility (if applicable)   · Name:  · Address:  · Dialysis Schedule:  · Phone:  · Fax:    / signature:

## 2020-07-08 NOTE — PROGRESS NOTES
Pt running low blood pressure throughout shift. [facility] updated and ordered total of three fluid boluses. Orders carried out and monitored. [facility] also notified of low urine output since surgery. Bladder scans showed 218, 281 and 338. Orders received if pt is unable to void on her own. RN will continue to monitor.

## 2020-07-08 NOTE — PROGRESS NOTES
Physical Therapy  Facility/Department: [address] ORTHO/MED SURG  Daily Treatment Note  NAME: Naima Nazario  : 1940  MRN: [id number]    Date of Service: 2020    Discharge Recommendations:  Patient would benefit from continued therapy after discharge   PT Equipment Recommendations  Equipment Needed: (TBD)    Assessment   Body structures, Functions, Activity limitations: Decreased functional mobility ; Decreased endurance;Decreased strength;Decreased balance  Assessment: Pt with significantly impaired mobility requiring mod to max A for all aspects of mobility. Pt would be usnafe to return ot prior living arrangements and will benefit from further therapy at discharge. Pt unable to demonstrate ability to ambulate a functional household distance at this time. Prognosis: Good;Fair  PT Education: Functional Mobility Training;Transfer Training;Gait Training  Patient Education: significant education on importance of mobility, PT POC, transfers and gait technique  REQUIRES PT FOLLOW UP: Yes  Activity Tolerance  Activity Tolerance: Patient limited by fatigue;Patient limited by pain     Patient Diagnosis(es): The encounter diagnosis was Post-op pain. has a past medical history of Abdominal aortic aneurysm (AAA) ([address] Aortic valve sclerosis, Arthritis, CAD (coronary artery disease), COPD (chronic obstructive pulmonary disease) ([address] Hypertension, Lumbar disc disease, Lung nodule, Osteoporosis, PVD (peripheral vascular disease) ([address] Raynaud's disease, Wears dentures, Wears prescription eyeglasses, Weight loss, and Wellness examination. has a past surgical history that includes Hysterectomy; Appendectomy; Cholecystectomy; Colonoscopy; Endoscopy, colon, diagnostic; Dilatation, esophagus; Tonsillectomy; Cardiac catheterization (2019); Cataract removal; ERCP; back surgery (2020); and lumbar fusion (N/A, 2020).     Restrictions  Restrictions/Precautions  Restrictions/Precautions: Up as Tolerated, Weight Bearing, Fall Risk  Required Braces or Orthoses?: No  Lower Extremity Weight Bearing Restrictions    Position Activity Restriction  Other position/activity restrictions: up with assistance, weight bearing as tolerated per ortho  Subjective   General  Response To Previous Treatment: Patient with no complaints from previous session. Family / Caregiver Present: No  Subjective  Subjective: Pt supine in bed and agreeable to therapy with maximal encouragement and education. Pt states her stomach doesn't feel well. RN agreeable to therapy. Pain Screening  Patient Currently in Pain: Yes  Pain Assessment  Pain Assessment: 0-10  Pain Level: 6  Pain Type: Surgical pain  Pain Location: Back  Pain Descriptors: Aching;Discomfort  Functional Pain Assessment: Prevents or interferes some active activities and ADLs  Non-Pharmaceutical Pain Intervention(s): Ambulation/Increased Activity; Distraction;Repositioned  Response to Pain Intervention: Patient Satisfied  Vital Signs  Patient Currently in Pain: Yes       Objective   Bed mobility  Supine to Sit: Moderate assistance  Sit to Supine: Moderate assistance  Scooting: Minimal assistance  Comment: Cues for sequencing, log rolling technique, significantly increased time  Transfers  Sit to Stand: Maximum Assistance  Stand to sit: Moderate Assistance  Comment: cues for hand placement, increased time  Ambulation  Ambulation?: Yes  Ambulation 1  Surface: level tile  Device: Rolling Walker  Assistance: Maximum assistance  Quality of Gait: difficulty clearing either LE, decreased step length, decreased gait speed, decreased endurance  Distance: 2 steps toward [facility]  Stairs/Curb  Stairs?: No     Balance  Posture: Fair  Sitting - Static: Poor;+  Sitting - Dynamic: Poor  Standing - Static: Poor  Standing - Dynamic: Poor;-  Comments: standing balance assessed with RW. Pt able to sit EOB x16 minutes with varied CG to min A.         Seated balance with weight shifting forward/backwards x10

## 2020-07-08 NOTE — PROGRESS NOTES
Patient was able to ambulate from commode to bed with a walker with assistance from her significant other. Patient reminded to notify nurse for assistance when ambulating.

## 2020-07-08 NOTE — PROGRESS NOTES
acetaminophen  650 mg Oral Q6H     Continuous Infusions:    sodium chloride 75 mL/hr at 20 1259     PRN Meds: promethazine, ondansetron, albuterol sulfate HFA, nitroGLYCERIN, ondansetron, sodium chloride flush, polyethylene glycol, oxyCODONE **OR** oxyCODONE, morphine **OR** morphine, tiZANidine    Data:     Past Medical History:   has a past medical history of Abdominal aortic aneurysm (AAA) ([facility] [address] Aortic valve sclerosis, Arthritis, CAD (coronary artery disease), COPD (chronic obstructive pulmonary disease) ([facility] [address] Hypertension, Lumbar disc disease, Lung nodule, Osteoporosis, PVD (peripheral vascular disease) [facility] Raynaud's disease, Wears dentures, Wears prescription eyeglasses, Weight loss, and Wellness examination. Social History:   reports that she quit smoking about 10 years ago. She smoked 0.50 packs per day. She has never used smokeless tobacco. She reports previous alcohol use. She reports that she does not use drugs. Family History:   Family History   Adopted: Yes       Vitals:  /61   Pulse 90   Temp 97 °F (36.1 °C) (Axillary)   Resp 20   Ht 5' 7\" (1.702 m)   Wt 88 lb (39.9 kg)   SpO2 93%   BMI 13.78 kg/m²   Temp (24hrs), Av.1 °F (36.7 °C), Min:97 °F (36.1 °C), Max:99.2 °F (37.3 °C)    Recent Labs     20  0811   POCGLU 98       I/O (24Hr):     Intake/Output Summary (Last 24 hours) at 2020 1348  Last data filed at 2020 2030  Gross per 24 hour   Intake --   Output 450 ml   Net -450 ml       Labs:  Hematology:  Recent Labs     20  1838 20  0653 20  0745   WBC  --  18.0* 16.8*   RBC  --  2.77* 2.72*   HGB 9.8* 8.8* 8.9*   HCT 30.4* 28.8* 28.4*   MCV  --  104.0* 104.4*   MCH  --  31.8 32.7   MCHC  --  30.6 31.3   RDW  --  12.7 12.8   PLT  --  See Reflexed IPF Result See Reflexed IPF Result   MPV  --  NOT REPORTED NOT REPORTED     Chemistry:  Recent Labs     20  0811 20  0745 20  0341   NA  --  139 138   K  --  5.2 4.5   CL  --  104 108*   CO2  --  24 18*   GLUCOSE  --  102* 80   BUN  --  20 17   CREATININE 0.56 0.52 0.44*   ANIONGAP  --  11 12   LABGLOM >60 >60 >60   GFRAA  --  >60 >60   CALCIUM  --  8.1* 7.5*     Recent Labs     07/06/20  0811 07/07/20  0745   PROT  --  4.8*   LABALBU  --  2.5*   AST  --  25   ALT  --  9   ALKPHOS  --  111*   BILITOT  --  <0.10*   BILIDIR  --  <0.08   POCGLU 98  --      Radiology:  Xr Lumbar Spine 1 Vw    Result Date: 7/6/2020  Lateral localization image intraoperative with probe superficial to the L5-S1 disc space. Physical Examination:        General appearance:  alert, cooperative and no distress, appears uncomfortable. +Dry heaving   Mental Status:  oriented to person, place and time and flat affect  Lungs:  Good b/l air entry, no rhonchi/rales  Heart:  regular rate and rhythm, +sys murmur  Abdomen:  soft, nontender, nondistended, normal bowel sounds   Extremities:  no edema, redness, tenderness in the calves  Skin:  no gross lesions, rashes, induration  Surgical site in dressing. Assessment:        Hospital Problems           Last Modified POA    * (Principal) S/P lumbar fusion 7/6/2020 Yes    CAD (coronary artery disease) 7/6/2020 Yes    COPD (chronic obstructive pulmonary disease) ([facility] [address] 7/6/2020 Yes    Osteoporosis 7/6/2020 Yes    PVD (peripheral vascular disease) ([facility] [address] 7/6/2020 Yes    Hypertension 7/6/2020 Yes        Plan:      - continue IV hydration. Resume Atenolol with holding parameters. - started on gabapentin last evening-tolerating well:continue  - postop urinary retention: encourage ambulation as tolerated and if ok with surgeons. Bladder scan and straight cath PRN. Start Flomax.   - leukocytosis likely reactive. Is afebrile. Keep off abx. CBC today. - continue home medications as reconciled. - DVT prophylaxis per primary.      Rita Renae MD  7/8/2020

## 2020-07-08 NOTE — CARE COORDINATION
TRANSITIONAL CARE PLANNING/ 2 [facility]: 2    Reason for Admission: S/P lumbar fusion [Z98.1]     Treatment Plan of Care:     Tests/Procedures still needed:     Barriers to Discharge:     Readmission Risk              Risk of Unplanned Readmission:        12            Patient goals/Treatment Preferences/Transitional Plan:     Referrals Made: [facility] called left message for Eula with admissions await call back     Follow Up needed:     Spoke with pt and her s/o at bedside the plan is [facility] until she is strong enough to go home with her s/o

## 2020-07-08 NOTE — PROGRESS NOTES
Orthopedic Progress Note    Patient: Heather Wyatt  YOB: 1940     68 y.o. female    Subjective:  Patient seen and examined at bedside. Low BP overnight. Received 3 bolus NS. IM aware. Asymptomatic when standing. Minimal urine output per nursing. Mild pain to the back. Wound care on. Still complains of mild left sided gluteal/proximal leg pain. Drain output roughly 100 cc over the last 12 hours. Vitals reviewed, afebrile    Objective:   Vitals:    07/08/20 0431   BP: (!) 103/56   Pulse: 113   Resp:    Temp:    SpO2:      Gen: NAD, cooperative    L-Spine: Dressings to low back are c/d/i without saturation. Drain maintaining adequate suction, 180cc removed from drain this morning. No erythema or cellulitic changes outside the dressing. Appropriate TTP to the lumbar spine. BLE: No visible deformity or edema to lower extremities. Skin is intact. Non-tender to palpation. Compartments are soft and compressible. EHL/FHL/TA/GS complex motor intact with 5/5 strength. Sural, saphenous, superificial/deep peroneal, and plantar nerve distribution SILT. Dorsalis pedis pulse 2+ with BCR. Recent Labs     07/07/20  0653  07/08/20  0341   WBC 18.0*  --   --    HGB 8.8*  --   --    HCT 28.8*  --   --    PLT See Reflexed IPF Result  --   --    NA  --    < > 138   K  --    < > 4.5   BUN  --    < > 17   CREATININE  --    < > 0.44*   GLUCOSE  --    < > 80    < > = values in this interval not displayed. Meds: See rec for complete list    Impression/plan: 68 y.o. female s/p Laminectomy L3-L4, PLIF L4-L5 with transition jefry placement L2-L5, POD#2    -Post op Abx completed  -WB status: Activity as tolerated  -Maintain drain, continue to record output. Output over last 12 hours roughly 100 cc  -Maintain dressings to low back. Ok to reinforce as needed per nursing. Wound care on.  -IM team on for assistance with medical management  -Pain control PO/IV Medication.  Attempt to Wean IV medications. -DVT ppx: Please hold chemical AC until POD#5  -Ice to low back for pain control and edema  -Encourage Incentive Spirometry use  -PT/OT to eval and treat today  -Follow up in office with Dr. Beatrice Marquez 10-14 days from surgery  -Please page DO ortho with any questions    Shahram Lynn DO  PGY-2 Orthopedic Surgery  5:09 AM 7/8/2020     Attending Physician Statement  I have discussed the care of Annamarie Crabtree  , including pertinent history and exam findings with the resident. I have seen and examined the patient and the key elements of all parts of the encounter have been performed by me. I agree with the assessment, plan and orders as documented by the resident. Improved to leg pains but buttock and ache to thighs. Drain still significant so will plan DC in am and mobilize. Add Decadron and lidocaine patch.

## 2020-07-08 NOTE — PLAN OF CARE
Problem: Pain:  Goal: Pain level will decrease  Description: Pain level will decrease  Outcome: Ongoing  Goal: Control of acute pain  Description: Control of acute pain  Outcome: Ongoing  Goal: Control of chronic pain  Description: Control of chronic pain  Outcome: Ongoing     Problem: Injury - Risk of, Physical Injury:  Goal: Absence of physical injury  Description: Absence of physical injury  Outcome: Met This Shift  Goal: Will remain free from falls  Description: Will remain free from falls  Outcome: Met This Shift     Problem: Discharge Planning:  Goal: Ability to perform activities of daily living will improve  Description: Ability to perform activities of daily living will improve  Outcome: Ongoing  Goal: Participates in care planning  Description: Participates in care planning  Outcome: Ongoing     Problem: Psychomotor Activity - Altered:  Goal: Absence of psychomotor disturbance signs and symptoms  Description: Absence of psychomotor disturbance signs and symptoms  Outcome: Ongoing     Problem: Sensory Perception - Impaired:  Goal: Demonstrations of improved sensory functioning will increase  Description: Demonstrations of improved sensory functioning will increase  Outcome: Ongoing  Goal: Decrease in sensory misperception frequency  Description: Decrease in sensory misperception frequency  Outcome: Ongoing  Goal: Able to refrain from responding to false sensory perceptions  Description: Able to refrain from responding to false sensory perceptions  Outcome: Ongoing  Goal: Demonstrates accurate environmental perceptions  Description: Demonstrates accurate environmental perceptions  Outcome: Ongoing  Goal: Able to distinguish between reality-based and nonreality-based thinking  Description: Able to distinguish between reality-based and nonreality-based thinking  Outcome: Ongoing  Goal: Able to interrupt nonreality-based thinking  Description: Able to interrupt nonreality-based thinking  Outcome: Ongoing Problem: Falls - Risk of:  Goal: Absence of physical injury  Description: Absence of physical injury  Outcome: Met This Shift  Goal: Will remain free from falls  Description: Will remain free from falls  Outcome: Met This Shift     Problem: Musculor/Skeletal Functional Status  Goal: Highest potential functional level  Outcome: Ongoing

## 2020-07-09 VITALS
HEIGHT: 67 IN | SYSTOLIC BLOOD PRESSURE: 134 MMHG | HEART RATE: 61 BPM | TEMPERATURE: 98.3 F | RESPIRATION RATE: 20 BRPM | DIASTOLIC BLOOD PRESSURE: 78 MMHG | OXYGEN SATURATION: 92 % | BODY MASS INDEX: 13.81 KG/M2 | WEIGHT: 88 LBS

## 2020-07-09 LAB
ABSOLUTE EOS #: 0 K/UL (ref 0–0.44)
ABSOLUTE IMMATURE GRANULOCYTE: 0.18 K/UL (ref 0–0.3)
ABSOLUTE LYMPH #: 0.53 K/UL (ref 1.1–3.7)
ABSOLUTE MONO #: 0.53 K/UL (ref 0.1–1.2)
ANION GAP SERPL CALCULATED.3IONS-SCNC: 17 MMOL/L (ref 9–17)
BASOPHILS # BLD: 0 % (ref 0–2)
BASOPHILS ABSOLUTE: 0 K/UL (ref 0–0.2)
BILIRUBIN URINE: NEGATIVE
BUN BLDV-MCNC: 13 MG/DL (ref 8–23)
BUN/CREAT BLD: ABNORMAL (ref 9–20)
CALCIUM SERPL-MCNC: 8.2 MG/DL (ref 8.6–10.4)
CHLORIDE BLD-SCNC: 104 MMOL/L (ref 98–107)
CO2: 16 MMOL/L (ref 20–31)
COLOR: YELLOW
COMMENT UA: ABNORMAL
CREAT SERPL-MCNC: 0.45 MG/DL (ref 0.5–0.9)
DIFFERENTIAL TYPE: ABNORMAL
EOSINOPHILS RELATIVE PERCENT: 0 % (ref 1–4)
GFR AFRICAN AMERICAN: >60 ML/MIN
GFR NON-AFRICAN AMERICAN: >60 ML/MIN
GFR SERPL CREATININE-BSD FRML MDRD: ABNORMAL ML/MIN/{1.73_M2}
GFR SERPL CREATININE-BSD FRML MDRD: ABNORMAL ML/MIN/{1.73_M2}
GLUCOSE BLD-MCNC: 83 MG/DL (ref 70–99)
GLUCOSE URINE: NEGATIVE
HCT VFR BLD CALC: 26.3 % (ref 36.3–47.1)
HEMOGLOBIN: 8.4 G/DL (ref 11.9–15.1)
IMMATURE GRANULOCYTES: 1 %
KETONES, URINE: ABNORMAL
LEUKOCYTE ESTERASE, URINE: NEGATIVE
LYMPHOCYTES # BLD: 3 % (ref 24–43)
MCH RBC QN AUTO: 32.8 PG (ref 25.2–33.5)
MCHC RBC AUTO-ENTMCNC: 31.9 G/DL (ref 28.4–34.8)
MCV RBC AUTO: 102.7 FL (ref 82.6–102.9)
MONOCYTES # BLD: 3 % (ref 3–12)
MORPHOLOGY: NORMAL
NITRITE, URINE: NEGATIVE
NRBC AUTOMATED: 0 PER 100 WBC
PDW BLD-RTO: 12.8 % (ref 11.8–14.4)
PH UA: 5 (ref 5–8)
PLATELET # BLD: 462 K/UL (ref 138–453)
PLATELET ESTIMATE: ABNORMAL
PMV BLD AUTO: 8.6 FL (ref 8.1–13.5)
POTASSIUM SERPL-SCNC: 4.3 MMOL/L (ref 3.7–5.3)
PROTEIN UA: NEGATIVE
RBC # BLD: 2.56 M/UL (ref 3.95–5.11)
RBC # BLD: ABNORMAL 10*6/UL
SEG NEUTROPHILS: 93 % (ref 36–65)
SEGMENTED NEUTROPHILS ABSOLUTE COUNT: 16.26 K/UL (ref 1.5–8.1)
SODIUM BLD-SCNC: 137 MMOL/L (ref 135–144)
SPECIFIC GRAVITY UA: 1.02 (ref 1–1.03)
TURBIDITY: CLEAR
URINE HGB: NEGATIVE
UROBILINOGEN, URINE: NORMAL
WBC # BLD: 17.5 K/UL (ref 3.5–11.3)
WBC # BLD: ABNORMAL 10*3/UL

## 2020-07-09 PROCEDURE — 2580000003 HC RX 258: Performed by: STUDENT IN AN ORGANIZED HEALTH CARE EDUCATION/TRAINING PROGRAM

## 2020-07-09 PROCEDURE — 81003 URINALYSIS AUTO W/O SCOPE: CPT

## 2020-07-09 PROCEDURE — 6370000000 HC RX 637 (ALT 250 FOR IP): Performed by: STUDENT IN AN ORGANIZED HEALTH CARE EDUCATION/TRAINING PROGRAM

## 2020-07-09 PROCEDURE — 94640 AIRWAY INHALATION TREATMENT: CPT

## 2020-07-09 PROCEDURE — 36415 COLL VENOUS BLD VENIPUNCTURE: CPT

## 2020-07-09 PROCEDURE — 99232 SBSQ HOSP IP/OBS MODERATE 35: CPT | Performed by: INTERNAL MEDICINE

## 2020-07-09 PROCEDURE — 80048 BASIC METABOLIC PNL TOTAL CA: CPT

## 2020-07-09 PROCEDURE — 85025 COMPLETE CBC W/AUTO DIFF WBC: CPT

## 2020-07-09 PROCEDURE — 6370000000 HC RX 637 (ALT 250 FOR IP): Performed by: HOSPITALIST

## 2020-07-09 PROCEDURE — 94664 DEMO&/EVAL PT USE INHALER: CPT

## 2020-07-09 PROCEDURE — 6370000000 HC RX 637 (ALT 250 FOR IP): Performed by: INTERNAL MEDICINE

## 2020-07-09 PROCEDURE — 2700000000 HC OXYGEN THERAPY PER DAY

## 2020-07-09 PROCEDURE — 6360000002 HC RX W HCPCS: Performed by: STUDENT IN AN ORGANIZED HEALTH CARE EDUCATION/TRAINING PROGRAM

## 2020-07-09 PROCEDURE — 94760 N-INVAS EAR/PLS OXIMETRY 1: CPT

## 2020-07-09 PROCEDURE — 6360000002 HC RX W HCPCS: Performed by: ORTHOPAEDIC SURGERY

## 2020-07-09 RX ORDER — DEXAMETHASONE 0.5 MG/1
2 TABLET ORAL 2 TIMES DAILY
Qty: 16 TABLET | Refills: 0 | Status: SHIPPED | OUTPATIENT
Start: 2020-07-09 | End: 2020-07-11

## 2020-07-09 RX ORDER — GABAPENTIN 100 MG/1
100 CAPSULE ORAL 3 TIMES DAILY
Qty: 45 CAPSULE | Refills: 0 | Status: SHIPPED | OUTPATIENT
Start: 2020-07-09 | End: 2020-07-24

## 2020-07-09 RX ORDER — DEXAMETHASONE SODIUM PHOSPHATE 4 MG/ML
2 INJECTION, SOLUTION INTRA-ARTICULAR; INTRALESIONAL; INTRAMUSCULAR; INTRAVENOUS; SOFT TISSUE 2 TIMES DAILY
Qty: 2 ML | Refills: 0 | Status: SHIPPED | OUTPATIENT
Start: 2020-07-09 | End: 2020-07-09 | Stop reason: HOSPADM

## 2020-07-09 RX ADMIN — BUDESONIDE AND FORMOTEROL FUMARATE DIHYDRATE 2 PUFF: 160; 4.5 AEROSOL RESPIRATORY (INHALATION) at 08:26

## 2020-07-09 RX ADMIN — SERTRALINE 100 MG: 50 TABLET, FILM COATED ORAL at 08:58

## 2020-07-09 RX ADMIN — ATENOLOL 50 MG: 50 TABLET ORAL at 08:58

## 2020-07-09 RX ADMIN — TAMSULOSIN HYDROCHLORIDE 0.4 MG: 0.4 CAPSULE ORAL at 08:58

## 2020-07-09 RX ADMIN — OXYCODONE HYDROCHLORIDE 10 MG: 5 TABLET ORAL at 06:10

## 2020-07-09 RX ADMIN — OXYCODONE HYDROCHLORIDE 5 MG: 5 TABLET ORAL at 15:02

## 2020-07-09 RX ADMIN — ACETAMINOPHEN 650 MG: 325 TABLET ORAL at 12:01

## 2020-07-09 RX ADMIN — OXYCODONE HYDROCHLORIDE 10 MG: 5 TABLET ORAL at 10:49

## 2020-07-09 RX ADMIN — GABAPENTIN 200 MG: 100 CAPSULE ORAL at 14:32

## 2020-07-09 RX ADMIN — GABAPENTIN 200 MG: 100 CAPSULE ORAL at 08:58

## 2020-07-09 RX ADMIN — TIOTROPIUM BROMIDE INHALATION SPRAY 2 PUFF: 3.12 SPRAY, METERED RESPIRATORY (INHALATION) at 08:26

## 2020-07-09 RX ADMIN — ACETAMINOPHEN 650 MG: 325 TABLET ORAL at 05:00

## 2020-07-09 RX ADMIN — DEXAMETHASONE SODIUM PHOSPHATE 2 MG: 4 INJECTION, SOLUTION INTRAMUSCULAR; INTRAVENOUS at 12:04

## 2020-07-09 RX ADMIN — Medication 10 ML: at 08:58

## 2020-07-09 RX ADMIN — DEXAMETHASONE SODIUM PHOSPHATE 2 MG: 4 INJECTION, SOLUTION INTRAMUSCULAR; INTRAVENOUS at 04:56

## 2020-07-09 RX ADMIN — MORPHINE SULFATE 1 MG: 2 INJECTION, SOLUTION INTRAMUSCULAR; INTRAVENOUS at 05:00

## 2020-07-09 RX ADMIN — OXYCODONE HYDROCHLORIDE 10 MG: 5 TABLET ORAL at 02:07

## 2020-07-09 ASSESSMENT — PAIN SCALES - GENERAL
PAINLEVEL_OUTOF10: 6
PAINLEVEL_OUTOF10: 6
PAINLEVEL_OUTOF10: 8
PAINLEVEL_OUTOF10: 9
PAINLEVEL_OUTOF10: 8
PAINLEVEL_OUTOF10: 6
PAINLEVEL_OUTOF10: 5

## 2020-07-09 NOTE — PROGRESS NOTES
REPORTED NOT REPORTED 8.6     Chemistry:  Recent Labs     07/07/20  0745 07/08/20  0341 07/09/20  0855    138 137   K 5.2 4.5 4.3    108* 104   CO2 24 18* 16*   GLUCOSE 102* 80 83   BUN 20 17 13   CREATININE 0.52 0.44* 0.45*   ANIONGAP 11 12 17   LABGLOM >60 >60 >60   GFRAA >60 >60 >60   CALCIUM 8.1* 7.5* 8.2*     Recent Labs     07/07/20  0745   PROT 4.8*   LABALBU 2.5*   AST 25   ALT 9   ALKPHOS 111*   BILITOT <0.10*   BILIDIR <0.08     Radiology:  Xr Lumbar Spine 1 Vw    Result Date: 7/6/2020  Lateral localization image intraoperative with probe superficial to the L5-S1 disc space. Physical Examination:        General appearance:  alert, cooperative and no distress, appears uncomfortable. +Dry heaving   Mental Status:  oriented to person, place and time and flat affect  Lungs:  Good b/l air entry, no rhonchi/rales  Heart:  regular rate and rhythm, +sys murmur  Abdomen:  soft, nontender, nondistended, normal bowel sounds   Extremities:  no edema, redness, tenderness in the calves  Skin:  no gross lesions, rashes, induration  Surgical site in dressing. Assessment:        Hospital Problems           Last Modified POA    * (Principal) S/P lumbar fusion 7/6/2020 Yes    CAD (coronary artery disease) 7/6/2020 Yes    COPD (chronic obstructive pulmonary disease) ([facility] [address] 7/6/2020 Yes    Osteoporosis 7/6/2020 Yes    PVD (peripheral vascular disease) ([facility] [address] 7/6/2020 Yes    Hypertension 7/6/2020 Yes        Plan:      - d/c IV hydration, has decent oral intake. continue Atenolol with holding parameters. - started on steroids per primary which seems to have helped with pain control. continue gabapentin  - postop urinary retention: encourage ambulation as tolerated and if ok with surgeons. Continue  Flomax.   - leukocytosis likely reactive. Is afebrile. Keep off abx.   - continue home medications as reconciled. - DVT prophylaxis per primary. - d/c to SNF at discretion of primary service.  Will need void trial in 2-3 days.      Nia Cheney MD  7/9/2020

## 2020-07-09 NOTE — PROGRESS NOTES
Rn called to room by aide stating pt's Hemovac was laying on the bed when she got pt up to the commode. Notified ortho that Hemovac became unattached. Ortho stated they would come take a look at it soon. Writer will continue to monitor.

## 2020-07-09 NOTE — PROGRESS NOTES
Orthopedic Progress Note    Patient: Adrianna Rodriguez  YOB: 1940     68 y.o. female    Subjective:  Patient seen and examined  No complaints or concerns  Patient straight cathed for 1000 mL last night, kramer placed and flomax started by internal medicine  Patient surgical drain had disconnected last night while patient was going to the restroom, nurse reconnected the drain and it maintained suction but did not drain  Pain controlled  Denies fever, HA, CP, SOB, N/V, dysuria  + void, - BM  PT 1ft w/ RW    Vitals reviewed, afebrile    Objective:   Vitals:    07/08/20 2000   BP: 106/63   Pulse: 113   Resp: 20   Temp:    SpO2:      Gen: NAD, cooperative  Cardiovascular: Regular rate, no dependent edema, distal pulses 2+  Respiratory: Chest symmetric, no accessory muscle use, normal respirations, no audible wheezes  MSK:  Back: surgical dressing in place clean, dry, and intact. Drain in with minimal output and maintaining suction. Bilateral lower extremity:   Sensation intact to light touch about L2-S1. EHL/FHL/TA/GS motor complex intact. Compartments are soft. Dorsalis pedis 2+ w/ BCR     Recent Labs     07/07/20  0745 07/08/20  0341   WBC 16.8*  --    HGB 8.9*  --    HCT 28.4*  --    PLT See Reflexed IPF Result  --     138   K 5.2 4.5   BUN 20 17   CREATININE 0.52 0.44*   GLUCOSE 102* 80      Meds: EPC  See rec for complete list    Impression/plan: 68 y.o. female s/p Laminectomy L3-L4, PLIF L4-L5 with transition jefry placement L2-L5, POD#3 DOS 7/6/2020     -WB status: Activity as tolerated  -Drain pulled today, maintain surgical dressing to back. Reinforce as needed. If saturation is seen please notify ortho  -Maintain dressings to low back. Ok to reinforce as needed per nursing. Wound care on.  -IM team on for assistance with medical management, appreciate recs   -Pain control PO/IV Medication. Attempt to Wean IV medications.    -DVT ppx: Please hold chemical AC until POD#5  -Ice to

## 2020-07-09 NOTE — PLAN OF CARE
BRONCHOSPASM/BRONCHOCONSTRICTION     [x]         IMPROVE AERATION/BREATH SOUNDS  []   ADMINISTER BRONCHODILATOR THERAPY AS APPROPRIATE  [x]   ASSESS BREATH SOUNDS  [x]   IMPLEMENT AEROSOL/MDI PROTOCOL  [x]   PATIENT EDUCATION AS NEEDED

## 2020-07-09 NOTE — PLAN OF CARE
Problem: Pain:  Goal: Pain level will decrease  Description: Pain level will decrease  Outcome: Ongoing  Goal: Control of acute pain  Description: Control of acute pain  Outcome: Ongoing  Goal: Control of chronic pain  Description: Control of chronic pain  Outcome: Ongoing     Problem: Confusion - Acute:  Goal: Absence of continued neurological deterioration signs and symptoms  Description: Absence of continued neurological deterioration signs and symptoms  Outcome: Ongoing  Goal: Mental status will be restored to baseline  Description: Mental status will be restored to baseline  Outcome: Ongoing     Problem: Discharge Planning:  Goal: Ability to perform activities of daily living will improve  Description: Ability to perform activities of daily living will improve  Outcome: Ongoing  Goal: Participates in care planning  Description: Participates in care planning  Outcome: Ongoing     Problem: Injury - Risk of, Physical Injury:  Goal: Absence of physical injury  Description: Absence of physical injury  Outcome: Ongoing  Goal: Will remain free from falls  Description: Will remain free from falls  Outcome: Ongoing     Problem: Mood - Altered:  Goal: Mood stable  Description: Mood stable  Outcome: Ongoing  Goal: Absence of abusive behavior  Description: Absence of abusive behavior  Outcome: Ongoing  Goal: Verbalizations of feeling emotionally comfortable while being cared for will increase  Description: Verbalizations of feeling emotionally comfortable while being cared for will increase  Outcome: Ongoing     Problem: Psychomotor Activity - Altered:  Goal: Absence of psychomotor disturbance signs and symptoms  Description: Absence of psychomotor disturbance signs and symptoms  Outcome: Ongoing     Problem: Sensory Perception - Impaired:  Goal: Demonstrations of improved sensory functioning will increase  Description: Demonstrations of improved sensory functioning will increase  Outcome: Ongoing  Goal: Decrease in sensory misperception frequency  Description: Decrease in sensory misperception frequency  Outcome: Ongoing  Goal: Able to refrain from responding to false sensory perceptions  Description: Able to refrain from responding to false sensory perceptions  Outcome: Ongoing  Goal: Demonstrates accurate environmental perceptions  Description: Demonstrates accurate environmental perceptions  Outcome: Ongoing  Goal: Able to distinguish between reality-based and nonreality-based thinking  Description: Able to distinguish between reality-based and nonreality-based thinking  Outcome: Ongoing  Goal: Able to interrupt nonreality-based thinking  Description: Able to interrupt nonreality-based thinking  Outcome: Ongoing     Problem: Sleep Pattern Disturbance:  Goal: Appears well-rested  Description: Appears well-rested  Outcome: Ongoing     Problem: Falls - Risk of:  Goal: Absence of physical injury  Description: Absence of physical injury  Outcome: Ongoing  Goal: Will remain free from falls  Description: Will remain free from falls  Outcome: Ongoing     Problem: Musculor/Skeletal Functional Status  Goal: Highest potential functional level  Outcome: Ongoing

## 2020-07-09 NOTE — PROGRESS NOTES
Report called to [facility] rachel HUERTASr spoke with Ce saldivar and report given. Phone number given to Jada Tolentino for any further questions. ......... Ronold Kanner Electronically signed by Bel Chan RN on 7/9/2020 at 3:13 PM

## 2020-07-23 NOTE — DISCHARGE SUMMARY
nitroGLYCERIN (NITROSTAT) 0.4 MG SL tablet  Place 1 tablet under the tongue every 5 minutes as needed For 3 doses total             ondansetron (ZOFRAN) 4 MG tablet  Take 4 mg by mouth every 8 hours as needed             sertraline (ZOLOFT) 100 MG tablet  Take 100 mg by mouth daily             Tiotropium Bromide Monohydrate (SPIRIVA HANDIHALER IN)  Inhale 1 puff into the lungs daily             traZODone (DESYREL) 150 MG tablet  Take 150 mg by mouth nightly                 Discharge Instructions: Follow up with Natividad Diaz MD in 3-4 weeks.    Follow up Dr. Bailey Collins in 2 weeks    Hospital acquired Infections: None     Austin Chavez DO   Orthopedic Surgery Resident PGY-1  [address] [facility]

## 2023-05-17 NOTE — TELEPHONE ENCOUNTER
Spoke with patient regarding a need for a covid test prior to her procedure on 7/6. Scheduled testing for 7/1 @ 11:00 am.  Gave patient directions and she verbalized understanding.
negative...

## (undated) DEVICE — 1000 S-DRAPE TOWEL DRAPE 10/BX: Brand: STERI-DRAPE™

## (undated) DEVICE — DRESSING PETRO W3XL8IN OIL EMUL N ADH GZ KNIT IMPREG CELOS

## (undated) DEVICE — SUTURE VCRL SZ 0 L27IN ABSRB UD L36MM CT-1 1/2 CIR J260H

## (undated) DEVICE — TUBING, SUCTION, 9/32" X 20', STRAIGHT: Brand: MEDLINE INDUSTRIES, INC.

## (undated) DEVICE — KIT EVAC 0.13IN RECT TB DIA10FR 400CC PVC 3 SPR Y CONN DRN

## (undated) DEVICE — TOWEL,OR,DSP,ST,NATURAL,DLX,4/PK,20PK/CS: Brand: MEDLINE

## (undated) DEVICE — PAD,ABDOMINAL,5"X9",ST,LF,25/BX: Brand: MEDLINE INDUSTRIES, INC.

## (undated) DEVICE — SVMMC ORTH SPINE PK

## (undated) DEVICE — CONTAINER,SPECIMEN,4OZ,OR STRL: Brand: MEDLINE

## (undated) DEVICE — DRAPE C ARM UNIV W41XL74IN CLR PLAS XR VELC CLSR POLY STRP

## (undated) DEVICE — JACKSON / MODULAR SPINAL TABLE STYLE POSITIONING KIT - STANDARD: Brand: SOULE MEDICAL

## (undated) DEVICE — JAR BONE ST

## (undated) DEVICE — GOWN,AURORA,NONRNF,XL,30/CS: Brand: MEDLINE

## (undated) DEVICE — GAUZE,SPONGE,FLUFF,6"X6.75",STRL,5/TRAY: Brand: MEDLINE

## (undated) DEVICE — GARMENT,MEDLINE,DVT,INT,CALF,MED, GEN2: Brand: MEDLINE

## (undated) DEVICE — BLADE ES L6IN ELASTOMERIC COAT EXT DURABLE BEND UPTO 90DEG

## (undated) DEVICE — THE MILL DISPOSABLE - MEDIUM

## (undated) DEVICE — GLOVE ORANGE PI 7 1/2   MSG9075

## (undated) DEVICE — BUR CUT RND FLUT SFT TOUCH 4.0MM

## (undated) DEVICE — GLOVE ORANGE PI 8   MSG9080

## (undated) DEVICE — Device

## (undated) DEVICE — SUTURE VIC + ANTIBACT NDL MO-4 1-0 27 VCP437H

## (undated) DEVICE — TOTAL TRAY, 16FR 10ML SIL FOLEY, URN: Brand: MEDLINE

## (undated) DEVICE — SUTURE VIC + ABS BR UD X1 2-0 27IN VCP459H

## (undated) DEVICE — SPONGE,NEURO,1"X1",XR,STRL,LF,10/PK: Brand: MEDLINE